# Patient Record
Sex: FEMALE | Race: WHITE | Employment: FULL TIME | URBAN - METROPOLITAN AREA
[De-identification: names, ages, dates, MRNs, and addresses within clinical notes are randomized per-mention and may not be internally consistent; named-entity substitution may affect disease eponyms.]

---

## 2017-08-01 ENCOUNTER — HOSPITAL ENCOUNTER (OUTPATIENT)
Dept: NON INVASIVE DIAGNOSTICS | Age: 42
Discharge: HOME | End: 2017-08-01
Attending: NURSE PRACTITIONER
Payer: COMMERCIAL

## 2017-08-01 DIAGNOSIS — R07.9 CHEST PAIN: ICD-10-CM

## 2017-08-01 DIAGNOSIS — R94.31 ABNORMAL EKG: ICD-10-CM

## 2017-08-01 DIAGNOSIS — R40.20 LOSS OF CONSCIOUSNESS (HCC): ICD-10-CM

## 2017-08-01 LAB
ATTENDING PHYSICIAN, CST07: NORMAL
DIAGNOSIS, 93000: NORMAL
DUKE TM SCORE RESULT, CST14: NORMAL
DUKE TREADMILL SCORE, CST13: NORMAL
ECG INTERP BEFORE EX, CST11: NORMAL
ECG INTERP DURING EX, CST12: NORMAL
FUNCTIONAL CAPACITY, CST17: NORMAL
KNOWN CARDIAC CONDITION, CST08: NORMAL
MAX. DIASTOLIC BP, CST04: 70 MMHG
MAX. HEART RATE, CST05: 173 BPM
MAX. SYSTOLIC BP, CST03: 150 MMHG
OVERALL BP RESPONSE TO EXERCISE, CST16: NORMAL
OVERALL HR RESPONSE TO EXERCISE, CST15: NORMAL
PEAK EX METS, CST10: 8.5 METS
PROTOCOL NAME, CST01: NORMAL
TEST INDICATION, CST09: NORMAL

## 2017-08-01 PROCEDURE — 93017 CV STRESS TEST TRACING ONLY: CPT

## 2018-11-02 ENCOUNTER — OFFICE VISIT (OUTPATIENT)
Dept: FAMILY MEDICINE CLINIC | Age: 43
End: 2018-11-02

## 2018-11-02 VITALS
DIASTOLIC BLOOD PRESSURE: 80 MMHG | WEIGHT: 127 LBS | RESPIRATION RATE: 16 BRPM | HEIGHT: 63 IN | OXYGEN SATURATION: 100 % | SYSTOLIC BLOOD PRESSURE: 118 MMHG | HEART RATE: 80 BPM | BODY MASS INDEX: 22.5 KG/M2 | TEMPERATURE: 97.7 F

## 2018-11-02 DIAGNOSIS — M79.671 RIGHT FOOT PAIN: Primary | ICD-10-CM

## 2018-11-02 RX ORDER — DULOXETIN HYDROCHLORIDE 60 MG/1
120 CAPSULE, DELAYED RELEASE ORAL DAILY
COMMUNITY

## 2018-11-02 RX ORDER — ACETAMINOPHEN 500 MG
TABLET ORAL 2 TIMES DAILY
COMMUNITY

## 2018-11-02 NOTE — PROGRESS NOTES
Chief Complaint   Patient presents with    Foot Pain     pt states right foot back for about a month and a half with piercing pain , not relived with ibuprofen     1. Have you been to the ER, urgent care clinic since your last visit? Hospitalized since your last visit? No    2. Have you seen or consulted any other health care providers outside of the [address] [place] [address] since your last visit? Include any pap smears or colon screening.  No

## 2018-11-08 NOTE — PROGRESS NOTES
Subjective: Dario Muñoz is an 37 y.o. female who presents with right foot pain. Pain over the 1st MTP joint. Worse with standing and walking. Usually wears athletic shoes. No injury or trauma. Past Medical History:   Diagnosis Date    Depression     ETOH abuse      Family History   Problem Relation Age of Onset    Cancer Father      Current Outpatient Medications   Medication Sig Dispense Refill    DULoxetine (CYMBALTA) 60 mg capsule Take 60 mg by mouth daily.  Cholecalciferol, Vitamin D3, (VITAMIN D3) 2,000 unit cap capsule Take  by mouth two (2) times a day.  multivitamin (ONE A DAY) tablet Take 1 Tab by mouth daily.  escitalopram (LEXAPRO) 20 mg tablet Take 20 mg by mouth daily. No Known Allergies  Social History     Socioeconomic History    Marital status: SINGLE     Spouse name: Not on file    Number of children: Not on file    Years of education: Not on file    Highest education level: Not on file   Social Needs    Financial resource strain: Not on file    Food insecurity - worry: Not on file    Food insecurity - inability: Not on file    Transportation needs - medical: Not on file   [employer] needs - non-medical: Not on file   Occupational History    Not on file   Tobacco Use    Smoking status: Former Smoker    Smokeless tobacco: Never Used   Substance and Sexual Activity    Alcohol use: No    Drug use: Not on file    Sexual activity: Not on file   Other Topics Concern    Not on file   Social History Narrative    Not on file       Review of Systems  Pertinent items are noted in HPI. Objective:     Visit Vitals  /80 (BP 1 Location: Left arm, BP Patient Position: Sitting)   Pulse 80   Temp 97.7 °F (36.5 °C) (Oral)   Resp 16   Ht 5' 3\" (1.6 m)   Wt 127 lb (57.6 kg)   LMP 10/16/2018 (Exact Date)   SpO2 100%   BMI 22.50 kg/m²       Gen: No apparent distress. Alert and oriented. Responds to all questions appropriately.    Ankle/Foot: right  Ankle Effusion: None  Great Toe MTP (normal/valgus/hallux rigidis/varus): bunion with mild hallux hallux valgus    ROM:  FROM of ankle and toes    Dynamic Test:  Gait: Normal    Palpation:  Achilles insertion tenderness: None   Achilles tendon tenderness: None   Great Toe IP joint tenderness: None  Great Toe MTP join tenderness: tender  Lisfranc Joint tenderness: None  Medial Malleolus tenderness: None  Lateral Malleolus tenderness: None  5th Metatarsal tenderness: None  Metatarsals tenderness: None  Navicular tenderness: None  Plantar fascia heel tenderness: None    Strength (0-5/5):   Plantarflexion: 5/5  Dorsiflexion: 5/5  Inversion: 5/5  Eversion: 5/5  FHL: 5/5  EHL: 5/5    Neuro/Vascular:  Pulses intact, no edema, and neurologically intact. Skin: No obvious rash      Assessment:       ICD-10-CM ICD-9-CM    1. Right foot pain M79.671 729.5      Sx likely due to bunion    Plan:       1. Discussed proper footwear. 2. Discussed corticosteroid injection if not improving. Medications:    1. Naproxin (Aleve): 220mg 1-2 tablets twice a day PRN. 2. Acetaminophen (Tylenol):  500mg 1-2 tablets every 6 hours as needed for pain.     RTC: PRN

## 2021-04-08 ENCOUNTER — APPOINTMENT (OUTPATIENT)
Dept: GENERAL RADIOLOGY | Age: 46
End: 2021-04-08
Attending: EMERGENCY MEDICINE
Payer: MEDICAID

## 2021-04-08 ENCOUNTER — APPOINTMENT (OUTPATIENT)
Dept: ULTRASOUND IMAGING | Age: 46
End: 2021-04-08
Attending: EMERGENCY MEDICINE
Payer: MEDICAID

## 2021-04-08 ENCOUNTER — HOSPITAL ENCOUNTER (EMERGENCY)
Age: 46
Discharge: HOME | End: 2021-04-08
Attending: EMERGENCY MEDICINE
Payer: MEDICAID

## 2021-04-08 VITALS
RESPIRATION RATE: 16 BRPM | HEART RATE: 94 BPM | SYSTOLIC BLOOD PRESSURE: 119 MMHG | WEIGHT: 140.65 LBS | BODY MASS INDEX: 24.92 KG/M2 | DIASTOLIC BLOOD PRESSURE: 85 MMHG | TEMPERATURE: 97.5 F | OXYGEN SATURATION: 97 %

## 2021-04-08 DIAGNOSIS — M79.604 ACUTE LEG PAIN, RIGHT: Primary | ICD-10-CM

## 2021-04-08 DIAGNOSIS — S32.401A CLOSED DISPLACED FRACTURE OF RIGHT ACETABULUM, UNSPECIFIED PORTION OF ACETABULUM, INITIAL ENCOUNTER (HCC): ICD-10-CM

## 2021-04-08 PROCEDURE — 74011000250 HC RX REV CODE- 250: Performed by: EMERGENCY MEDICINE

## 2021-04-08 PROCEDURE — 74011250637 HC RX REV CODE- 250/637: Performed by: EMERGENCY MEDICINE

## 2021-04-08 PROCEDURE — 73502 X-RAY EXAM HIP UNI 2-3 VIEWS: CPT

## 2021-04-08 PROCEDURE — 93971 EXTREMITY STUDY: CPT

## 2021-04-08 PROCEDURE — 99284 EMERGENCY DEPT VISIT MOD MDM: CPT

## 2021-04-08 RX ORDER — ACETAMINOPHEN 325 MG/1
650 TABLET ORAL
Status: DISCONTINUED | OUTPATIENT
Start: 2021-04-08 | End: 2021-04-08

## 2021-04-08 RX ORDER — CLONAZEPAM 0.5 MG/1
0.5 TABLET ORAL
COMMUNITY

## 2021-04-08 RX ORDER — METHOCARBAMOL 750 MG/1
750 TABLET, FILM COATED ORAL 4 TIMES DAILY
Qty: 4 TAB | Refills: 0 | Status: SHIPPED | OUTPATIENT
Start: 2021-04-08 | End: 2021-10-27

## 2021-04-08 RX ORDER — LIDOCAINE 4 G/100G
1 PATCH TOPICAL EVERY 24 HOURS
Status: DISCONTINUED | OUTPATIENT
Start: 2021-04-08 | End: 2021-04-08 | Stop reason: HOSPADM

## 2021-04-08 RX ORDER — IBUPROFEN 600 MG/1
600 TABLET ORAL
Status: COMPLETED | OUTPATIENT
Start: 2021-04-08 | End: 2021-04-08

## 2021-04-08 RX ADMIN — IBUPROFEN 600 MG: 600 TABLET, FILM COATED ORAL at 13:43

## 2021-04-08 NOTE — LETTER
[facility]  [address]  [address]  [phone]    Work/School Note    Date: 4/8/2021    To Whom It May concern:    Edgar Kong was seen and treated today in the emergency room by the following provider(s):  Attending Provider: Warden Ambrocio MD.      Edgar Kong is excused from work/school on 4/8/2021 through 4/11/2021. She is medically clear to return to work/school on 4/12/2021.         Sincerely,          Negrita Valenzuela RN

## 2021-04-08 NOTE — ED TRIAGE NOTES
Triage Note: Patient complains of right groin pain while standing for a long period of time today while training of work. Patient reports a \"pop\" when she walks. Denies injury.

## 2021-04-08 NOTE — ED PROVIDER NOTES
27-year-old female history of factor V Leiden, DVT, presenting to the ED for acute onset of right lower extremity pain. 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 The pain radiates up to her right hip. The pain is causing difficulty walking. She describes it as sharp in nature. No pain at rest.  She feels off balance when walking secondary to pain and difficulty bearing weight on that extremity. She has not yet taken any medication for the pain. She is not taking any blood thinning medication. She denies any chest pain, shortness of breath, abdominal pain, nausea, vomiting or recent illness.            Past Medical History:   Diagnosis Date    Depression     ETOH abuse     Factor [facility] Leiden mutation[facility]     Thromboembolus ([facility]        Past Surgical History:   Procedure Laterality Date    HX  SECTION      HX CHOLECYSTECTOMY           Family History:   Problem Relation Age of Onset    Cancer Father        Social History     Socioeconomic History    Marital status: SINGLE     Spouse name: Not on file    Number of children: Not on file    Years of education: Not on file    Highest education level: Not on file   Occupational History    Not on file   Social Needs    Financial resource strain: Not on file    Food insecurity     Worry: Not on file     Inability: Not on file    Transportation needs     Medical: Not on file     Non-medical: Not on file   Tobacco Use    Smoking status: Former Smoker    Smokeless tobacco: Never Used   Substance and Sexual Activity    Alcohol use: No    Drug use: Not Currently    Sexual activity: Not on file   Lifestyle    Physical activity     Days per week: Not on file     Minutes per session: Not on file    Stress: Not on file   Relationships    Social connections     Talks on phone: Not on file     Gets together: Not on file     Attends Sikhism service: Not on file     Active member of club or organization: Not on file     Attends meetings of clubs or organizations: Not on file     Relationship status: Not on file    Intimate partner violence     Fear of current or ex partner: Not on file     Emotionally abused: Not on file     Physically abused: Not on file     Forced sexual activity: Not on file   Other Topics Concern    Not on file   Social History Narrative    Not on file         ALLERGIES: Patient has no known allergies. Review of Systems   Constitutional: Negative for chills and fever. HENT: Negative for congestion. Eyes: Negative for visual disturbance. Respiratory: Negative for shortness of breath. Cardiovascular: Negative for chest pain. Gastrointestinal: Negative for abdominal pain, nausea and vomiting. Genitourinary: Negative for dysuria and hematuria. Musculoskeletal: Negative for back pain. Skin: Negative for rash. Allergic/Immunologic: Negative for immunocompromised state. Neurological: Negative for syncope, weakness and headaches. Psychiatric/Behavioral: Negative for confusion. Vitals:    04/08/21 1308   BP: (!) 136/97   Pulse: (!) 102   Resp: 16   Temp: 97.5 °F (36.4 °C)   SpO2: 98%   Weight: 63.8 kg (140 lb 10.5 oz)            Physical Exam  Vitals signs reviewed. Constitutional:       General: She is not in acute distress. Appearance: She is well-developed. HENT:      Head: Normocephalic and atraumatic. Eyes:      General: No scleral icterus. Neck:      Trachea: No tracheal deviation. Cardiovascular:      Rate and Rhythm: Normal rate. Pulmonary:      Effort: Pulmonary effort is normal. No respiratory distress. Abdominal:      General: There is no distension. Musculoskeletal:      Comments: No calf tenderness, no deformity, ecchymosis or swelling    Full range of motion at hip, knee and ankle on R   Skin:     General: Skin is warm and dry. Neurological:      Mental Status: She is alert and oriented to person, place, and time. MDM  Number of Diagnoses or Management Options  Acute leg pain, right  Closed displaced fracture of right acetabulum, unspecified portion of acetabulum, initial encounter [facility])  Diagnosis management comments: 27-year-old female history of factor V deficiency, DVT, presenting for acute onset of lower extremity discomfort. Will obtain duplex ultrasound to assess for possible DVT. No specific traumatic injury to warrant plain film imaging at this time. She likely is pulled a muscle. Will apply lidocaine patch, administer ibuprofen, will consider muscle relaxant if unrelieved by the above. Amount and/or Complexity of Data Reviewed  Tests in the radiology section of CPT®: ordered      ED Course as of Apr 10 1133   Thu Apr 08, 2021   1349 Duplex negative for DVT. [SL]   1917 Pt now complaining of hip pain with external rotation. Xray ordered. Added tylenol 650mg now dose. Pt is driving herself, so will avoid any sedating medications. [SL]   [phone] Patient told the nurse she did in fact take Tylenol just prior to arrival.  I have canceled the order.    [SL]   1425 Xray notable for RIGHT os     [SL]   1456 I spoke with Keegan Glasgow with orthopedics, the patient should follow-up with  at the [facility] office in roughly 1 week.     [SL]      ED Course User Index  [SL] Tana Bower MD       Procedures      Signed By: Ayde Bryan MD     April 10, 2021

## 2021-04-10 ENCOUNTER — IMMUNIZATION (OUTPATIENT)
Dept: INTERNAL MEDICINE CLINIC | Age: 46
End: 2021-04-10
Payer: MEDICAID

## 2021-04-10 DIAGNOSIS — Z23 ENCOUNTER FOR IMMUNIZATION: Primary | ICD-10-CM

## 2021-04-10 PROCEDURE — 91300 COVID-19, MRNA, LNP-S, PF, 30MCG/0.3ML DOSE(PFIZER): CPT | Performed by: FAMILY MEDICINE

## 2021-04-10 PROCEDURE — 0001A COVID-19, MRNA, LNP-S, PF, 30MCG/0.3ML DOSE(PFIZER): CPT | Performed by: FAMILY MEDICINE

## 2021-05-01 ENCOUNTER — IMMUNIZATION (OUTPATIENT)
Dept: INTERNAL MEDICINE CLINIC | Age: 46
End: 2021-05-01
Payer: MEDICAID

## 2021-05-01 DIAGNOSIS — Z23 ENCOUNTER FOR IMMUNIZATION: Primary | ICD-10-CM

## 2021-05-01 PROCEDURE — 91300 COVID-19, MRNA, LNP-S, PF, 30MCG/0.3ML DOSE(PFIZER): CPT | Performed by: FAMILY MEDICINE

## 2021-05-01 PROCEDURE — 0002A COVID-19, MRNA, LNP-S, PF, 30MCG/0.3ML DOSE(PFIZER): CPT | Performed by: FAMILY MEDICINE

## 2021-10-27 ENCOUNTER — VIRTUAL VISIT (OUTPATIENT)
Dept: FAMILY MEDICINE CLINIC | Age: 46
End: 2021-10-27
Payer: MEDICAID

## 2021-10-27 DIAGNOSIS — F41.8 DEPRESSION WITH ANXIETY: ICD-10-CM

## 2021-10-27 DIAGNOSIS — Z12.11 SCREEN FOR COLON CANCER: ICD-10-CM

## 2021-10-27 DIAGNOSIS — Z79.899 CHRONIC PRESCRIPTION BENZODIAZEPINE USE: ICD-10-CM

## 2021-10-27 DIAGNOSIS — F42.9 OBSESSIVE-COMPULSIVE DISORDER, UNSPECIFIED TYPE: ICD-10-CM

## 2021-10-27 DIAGNOSIS — Z76.89 ESTABLISHING CARE WITH NEW DOCTOR, ENCOUNTER FOR: ICD-10-CM

## 2021-10-27 DIAGNOSIS — E55.9 VITAMIN D DEFICIENCY: ICD-10-CM

## 2021-10-27 DIAGNOSIS — R53.83 FATIGUE, UNSPECIFIED TYPE: Primary | ICD-10-CM

## 2021-10-27 DIAGNOSIS — R00.0 TACHYCARDIA: ICD-10-CM

## 2021-10-27 DIAGNOSIS — Z13.220 SCREENING FOR LIPID DISORDERS: ICD-10-CM

## 2021-10-27 DIAGNOSIS — F10.21 ALCOHOLISM IN RECOVERY (HCC): ICD-10-CM

## 2021-10-27 DIAGNOSIS — R00.2 PALPITATIONS: ICD-10-CM

## 2021-10-27 DIAGNOSIS — F60.9 PERSONALITY DISORDER (HCC): ICD-10-CM

## 2021-10-27 DIAGNOSIS — Z11.59 ENCOUNTER FOR HEPATITIS C SCREENING TEST FOR LOW RISK PATIENT: ICD-10-CM

## 2021-10-27 PROCEDURE — 99204 OFFICE O/P NEW MOD 45 MIN: CPT | Performed by: FAMILY MEDICINE

## 2021-10-27 RX ORDER — BUSPIRONE HYDROCHLORIDE 30 MG/1
30 TABLET ORAL 2 TIMES DAILY
COMMUNITY
Start: 2021-10-16

## 2021-10-27 RX ORDER — FLUVOXAMINE MALEATE 100 MG/1
CAPSULE, EXTENDED RELEASE ORAL
COMMUNITY
Start: 2021-10-14 | End: 2022-09-30

## 2021-10-27 RX ORDER — NALTREXONE HYDROCHLORIDE 50 MG/1
TABLET, FILM COATED ORAL
COMMUNITY
Start: 2021-07-01

## 2021-10-27 NOTE — PROGRESS NOTES
Chief Complaint   Patient presents with   [facility] lab work and a physical.     1. Have you been to the ER, urgent care clinic since your last visit? Hospitalized since your last visit? Yes 09/29/2021 [facility], positive Covid-19 test.    2. Have you seen or consulted any other health care providers outside of the [address] [place] [address] since your last visit? Include any pap smears or colon screening.  No

## 2021-10-27 NOTE — PROGRESS NOTES
Jamie Amaya is a 55 y.o. female who was seen by synchronous (real-time) audio-video technology on 10/27/2021. Consent: Jamie Amaya, who was seen by synchronous (real-time) audio-video technology, and/or her healthcare decision maker, is aware that this patient-initiated, Telehealth encounter on 10/27/2021 is a billable service, with coverage as determined by her insurance carrier. She is aware that she may receive a bill and has provided verbal consent to proceed: Yes. Assessment & Plan:   1. Fatigue, unspecified type  May be multifactorial  Recommend labs  C/w follow up with psych and recommend in office exam  - CBC W/O DIFF; Future  - METABOLIC PANEL, COMPREHENSIVE; Future  - THYROID CASCADE PROFILE; Future    2. Palpitations  As above  - CBC W/O DIFF; Future  - METABOLIC PANEL, COMPREHENSIVE; Future  - THYROID CASCADE PROFILE; Future    3. Tachycardia  As above  - CBC W/O DIFF; Future  - METABOLIC PANEL, COMPREHENSIVE; Future  - THYROID CASCADE PROFILE; Future    4. Personality disorder [facility])  Per psych, changing medications presently    5. Depression with anxiety  As above    6. Obsessive-compulsive disorder, unspecified type  As above, adding luvox    7. Chronic prescription benzodiazepine use  On klonapin per psych    8. Vitamin D deficiency  Recheck labs  - VITAMIN D, 25 HYDROXY; Future    9. Alcoholism in recovery ([facility]  On naltrexone, participating aa, encouraged on sobriety    10. Screen for colon cancer  Recommend cscope  - REFERRAL FOR COLONOSCOPY    11. Screening for lipid disorders  Recommend flp  - METABOLIC PANEL, COMPREHENSIVE; Future  - LIPID PANEL; Future    12. Encounter for hepatitis C screening test for low risk patient  Recommend screening  - HEPATITIS C AB; Future    13. Establishing care with new doctor, encounter for  Recommend in office exam  Review of history  Will req pap          Pt was counseled on risks, benefits and alternatives of treatment options.  All questions were asked and answered and the patient was agreeable with the treatment plan as outlined. Total time on the date of encounter exceeded 45 minutes and included patient care, coordination of care, charting and preparation for visit. Subjective:    Ginna Sierra is a 55 y.o. 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  Last PCP: 3 years ago was seeing [facility] (saw an NP who left practice)  Dentist: goes to the dentist, last cleaning was in the spring time of last year  Eye Doc; last check a few years ago--has an appt in a few weeks, Dr Cindy Dominguez, [facility]--has readers, thinks she needs glasses    Exercise: started walking for fitness a few months ago, has fallen off a little from that  DIet: trying to eat healthy, mindful, drinking water, eating salad, appetite since she had covid has been \"weird\"--she did have anosmnia and loss of taste  Weight: was at 140, had a goal of losing weight, after covid she was having protein breakfast shakes because of her appetite, lost 8 lbs, is now 132    Tob: smoking about 1 ppd, goal of quitting, has been smoking for 17 years  Etoh: NO etoh abuse in recovery--was sober for years--when covid happened and she took a new job that didn't work out she relapsed and drank beer again, reports it got out of control, got sober again in summer 2020 and she is in aa, she is active in aa, she goes to 3 meetings a week  On naltrexone  Illicit: no    OBGYN: Dr Gladys Wiseman on Pap smears, just had it (NILM, HPV negative)--going for first mammogram in a few weeks at [facility]  PMS and back pain getting worse as she is getting older  Has had IUDs before, thinking about getting an IUD and planning to get with the coordinator    SA; not presently    When laying down to go to sleep, chest feeling tight, worried about cigarette smoking + covid    Psychiatry--Dr Anabel Suh at [facility]  Depression and Anxiety  Changing medications around now--getting weaned off cymbalta, moving to [place], has some increasing OCD tendencies as she is having higher stress  Was on wellbutrin as well but had stopped it because of the anxiety /ocd--holding this right now  Anxiety not controlled-not having panic attacks  Klonopin 3-4  0.5mg / day    Medications, allergies, PMH, PSH, SOCH, [address] reviewed and updated per routine protocol, see chart for review and changes if not noted here. ROS  A 12 point review of systems was negative except as noted here or in the HPI.     Objective:   Vital Signs: (As obtained by patient/caregiver at home)  Patient-Reported Vitals 10/27/2021   Patient-Reported Weight 132lb   Patient-Reported LMP 10/01/2021        [INSTRUCTIONS:  \"[x]\" Indicates a positive item  \"[]\" Indicates a negative item  -- DELETE ALL ITEMS NOT EXAMINED]    Constitutional: [x] Appears well-developed and well-nourished [x] No apparent distress      [] Abnormal -     Mental status: [x] Alert and awake  [x] Oriented to person/place/time [x] Able to follow commands    [] Abnormal -     Eyes:   EOM    [x]  Normal    [] Abnormal -   Sclera  [x]  Normal    [] Abnormal -          Discharge [x]  None visible   [] Abnormal -     HENT: [x] Normocephalic, atraumatic  [] Abnormal -   [x] Mouth/Throat: Mucous membranes are moist    External Ears [x] Normal  [] Abnormal -    Neck: [x] No visualized mass [] Abnormal -     Pulmonary/Chest: [x] Respiratory effort normal   [x] No visualized signs of difficulty breathing or respiratory distress        [] Abnormal -      Musculoskeletal:   [] Normal gait with no signs of ataxia         [x] Normal range of motion of neck        [] Abnormal -     Neurological:        [x] No Facial Asymmetry (Cranial nerve 7 motor function) (limited exam due to video visit)          [x] No gaze palsy        [] Abnormal -          Skin:        [x] No significant exanthematous lesions or discoloration noted on facial skin         [] Abnormal -            Psychiatric:       [x] Normal Affect [] Abnormal -        [x] No Hallucinations    Other pertinent observable physical exam findings:seated no distress, non toxic    We discussed the expected course, resolution and complications of the diagnosis(es) in detail. Medication risks, benefits, costs, interactions, and alternatives were discussed as indicated. I advised her to contact the office if her condition worsens, changes or fails to improve as anticipated. She expressed understanding with the diagnosis(es) and plan. Jamie Amaya is a 55 y.o. female who was evaluated by a video visit encounter for concerns as above. Patient identification was verified prior to start of the visit. A caregiver was present when appropriate. Due to this being a TeleHealth encounter (During IYMNP-08 public health emergency), evaluation of the following organ systems was limited: Vitals/Constitutional/EENT/Resp/CV/GI//MS/Neuro/Skin/Heme-Lymph-Imm. Pursuant to the emergency declaration under the [facility] waiver authority and the [employer] and [employer] Act, this Virtual  Visit was conducted, with patient's (and/or legal guardian's) consent, to reduce the patient's risk of exposure to COVID-19 and provide necessary medical care. Services were provided through a video synchronous discussion virtually to substitute for in-person clinic visit. Patient and provider were located at their individual homes. Chari Johnson MD  [facility]  10/27/21 11:05 AM     Portions of this note may have been populated using smart dictation software and may have \"sounds-like\" errors present.

## 2021-10-30 LAB
25(OH)D3+25(OH)D2 SERPL-MCNC: 47.1 NG/ML (ref 30–100)
ALBUMIN SERPL-MCNC: 4.7 G/DL (ref 3.8–4.8)
ALBUMIN/GLOB SERPL: 2.4 {RATIO} (ref 1.2–2.2)
ALP SERPL-CCNC: 88 IU/L (ref 44–121)
ALT SERPL-CCNC: 20 IU/L (ref 0–32)
AST SERPL-CCNC: 17 IU/L (ref 0–40)
BILIRUB SERPL-MCNC: 0.3 MG/DL (ref 0–1.2)
BUN SERPL-MCNC: 6 MG/DL (ref 6–24)
BUN/CREAT SERPL: 7 (ref 9–23)
CALCIUM SERPL-MCNC: 9.2 MG/DL (ref 8.7–10.2)
CHLORIDE SERPL-SCNC: 101 MMOL/L (ref 96–106)
CHOLEST SERPL-MCNC: 182 MG/DL (ref 100–199)
CO2 SERPL-SCNC: 24 MMOL/L (ref 20–29)
CREAT SERPL-MCNC: 0.84 MG/DL (ref 0.57–1)
ERYTHROCYTE [DISTWIDTH] IN BLOOD BY AUTOMATED COUNT: 12.6 % (ref 11.7–15.4)
GLOBULIN SER CALC-MCNC: 2 G/DL (ref 1.5–4.5)
GLUCOSE SERPL-MCNC: 123 MG/DL (ref 65–99)
HCT VFR BLD AUTO: 43.8 % (ref 34–46.6)
HCV AB S/CO SERPL IA: 0.1 S/CO RATIO (ref 0–0.9)
HDLC SERPL-MCNC: 65 MG/DL
HGB BLD-MCNC: 14.5 G/DL (ref 11.1–15.9)
IMP & REVIEW OF LAB RESULTS: NORMAL
INTERPRETIVE COMMENT, 010391: NORMAL
LDLC SERPL CALC-MCNC: 102 MG/DL (ref 0–99)
MCH RBC QN AUTO: 31.5 PG (ref 26.6–33)
MCHC RBC AUTO-ENTMCNC: 33.1 G/DL (ref 31.5–35.7)
MCV RBC AUTO: 95 FL (ref 79–97)
PLATELET # BLD AUTO: 368 X10E3/UL (ref 150–450)
POTASSIUM SERPL-SCNC: 3.9 MMOL/L (ref 3.5–5.2)
PROT SERPL-MCNC: 6.7 G/DL (ref 6–8.5)
RBC # BLD AUTO: 4.6 X10E6/UL (ref 3.77–5.28)
SODIUM SERPL-SCNC: 139 MMOL/L (ref 134–144)
T3FREE SERPL-MCNC: 3 PG/ML (ref 2–4.4)
T4 FREE SERPL-MCNC: 1.24 NG/DL (ref 0.82–1.77)
TRIGL SERPL-MCNC: 84 MG/DL (ref 0–149)
TSH SERPL DL<=0.005 MIU/L-ACNC: 0.38 UIU/ML (ref 0.45–4.5)
VLDLC SERPL CALC-MCNC: 15 MG/DL (ref 5–40)
WBC # BLD AUTO: 11.8 X10E3/UL (ref 3.4–10.8)

## 2022-03-09 ENCOUNTER — VIRTUAL VISIT (OUTPATIENT)
Dept: FAMILY MEDICINE CLINIC | Age: 47
End: 2022-03-09
Payer: MEDICAID

## 2022-03-09 DIAGNOSIS — B96.89 ACUTE BACTERIAL SINUSITIS: Primary | ICD-10-CM

## 2022-03-09 DIAGNOSIS — J01.90 ACUTE BACTERIAL SINUSITIS: Primary | ICD-10-CM

## 2022-03-09 PROCEDURE — 99213 OFFICE O/P EST LOW 20 MIN: CPT | Performed by: FAMILY MEDICINE

## 2022-03-09 RX ORDER — QUETIAPINE FUMARATE 50 MG/1
TABLET, EXTENDED RELEASE ORAL
COMMUNITY
Start: 2022-03-02 | End: 2022-10-28 | Stop reason: SDUPTHER

## 2022-03-09 RX ORDER — DOXYCYCLINE 100 MG/1
100 TABLET ORAL 2 TIMES DAILY
Qty: 20 TABLET | Refills: 0 | Status: SHIPPED | OUTPATIENT
Start: 2022-03-09 | End: 2022-03-19

## 2022-03-09 RX ORDER — DISULFIRAM 250 MG/1
250 TABLET ORAL DAILY
COMMUNITY
Start: 2022-01-12

## 2022-03-09 RX ORDER — BUPROPION HYDROCHLORIDE 300 MG/1
TABLET ORAL
COMMUNITY
Start: 2022-01-26

## 2022-03-09 NOTE — PROGRESS NOTES
Javier Lopes is a 55 y.o. female who was seen by synchronous (real-time) audio-video technology on 3/9/2022. Consent: Javier Lopes, who was seen by synchronous (real-time) audio-video technology, and/or her healthcare decision maker, is aware that this patient-initiated, Telehealth encounter on 3/9/2022 is a billable service, with coverage as determined by her insurance carrier. She is aware that she may receive a bill and has provided verbal consent to proceed: Yes. Assessment & Plan:   1. Acute bacterial sinusitis  At threshold for treatment  Pt is given anticipatory guidance  Supportive care  Doxy bid x10d  Recheck if not improved  - doxycycline (ADOXA) 100 mg tablet; Take 1 Tablet by mouth two (2) times a day for 10 days. Dispense: 20 Tablet; Refill: 0          Pt was counseled on risks, benefits and alternatives of treatment options. All questions were asked and answered and the patient was agreeable with the treatment plan as outlined. Subjective: Javier Lopes is a 55 y.o. female who was seen for Sinus Pain (x 5days.) and Headache      Pt tells me that for about 5-7 days shes started with upper teeth pain, now facial pressure bilatearlly  She went to the dentist at first  She is having daily headaches  Last night she tells me things acutely worsened and her face is feeling puffy    It is positional and if she bends over it moderately more uncomfortable    This feels like other sinus infections she's had before  No ear pain clicking and popping    Medications, allergies, PMH, PSH, SOCH, [facility] reviewed and updated per routine protocol, see chart for review and changes if not noted here. ROS  A 12 point review of systems was negative except as noted here or in the HPI.     Objective:   Vital Signs: (As obtained by patient/caregiver at home)  Patient-Reported Vitals 3/9/2022   Patient-Reported Weight -   Patient-Reported Systolic  613   Patient-Reported Diastolic 73   Patient-Reported LMP - [INSTRUCTIONS:  \"[x]\" Indicates a positive item  \"[]\" Indicates a negative item  -- DELETE ALL ITEMS NOT EXAMINED]    Constitutional: [x] Appears well-developed and well-nourished [x] No apparent distress      [] Abnormal -     Mental status: [x] Alert and awake  [x] Oriented to person/place/time [x] Able to follow commands    [] Abnormal -     Eyes:   EOM    [x]  Normal    [] Abnormal -   Sclera  [x]  Normal    [] Abnormal -          Discharge [x]  None visible   [] Abnormal -     HENT: [x] Normocephalic, atraumatic  [] Abnormal -   [x] Mouth/Throat: Mucous membranes are moist    External Ears [x] Normal  [] Abnormal -    Neck: [x] No visualized mass [] Abnormal -     Pulmonary/Chest: [x] Respiratory effort normal   [x] No visualized signs of difficulty breathing or respiratory distress        [] Abnormal -      Musculoskeletal:   [] Normal gait with no signs of ataxia         [x] Normal range of motion of neck        [] Abnormal -     Neurological:        [x] No Facial Asymmetry (Cranial nerve 7 motor function) (limited exam due to video visit)          [x] No gaze palsy        [] Abnormal -          Skin:        [x] No significant exanthematous lesions or discoloration noted on facial skin         [] Abnormal -            Psychiatric:       [x] Normal Affect [] Abnormal -        [x] No Hallucinations    Other pertinent observable physical exam findings:ambulatory through home    We discussed the expected course, resolution and complications of the diagnosis(es) in detail. Medication risks, benefits, costs, interactions, and alternatives were discussed as indicated. I advised her to contact the office if her condition worsens, changes or fails to improve as anticipated. She expressed understanding with the diagnosis(es) and plan. Merly Robins is a 55 y.o. female who was evaluated by a video visit encounter for concerns as above. Patient identification was verified prior to start of the visit.  A caregiver was present when appropriate. Due to this being a TeleHealth encounter (During LZWQI-52 public health emergency), evaluation of the following organ systems was limited: Vitals/Constitutional/EENT/Resp/CV/GI//MS/Neuro/Skin/Heme-Lymph-Imm. Pursuant to the emergency declaration under the [address] [place] [address] waiver authority and the [employer] and [employer] Act, this Virtual  Visit was conducted, with patient's (and/or legal guardian's) consent, to reduce the patient's risk of exposure to COVID-19 and provide necessary medical care. Services were provided through a video synchronous discussion virtually to substitute for in-person clinic visit. Patient and provider were located at their individual homes. Kelly Marie MD  [facility]  03/09/22 4:21 PM     Portions of this note may have been populated using smart dictation software and may have \"sounds-like\" errors present.

## 2022-03-09 NOTE — PROGRESS NOTES
Chief Complaint   Patient presents with    Sinus Pain     x 5days.  Headache     1. Have you been to the ER, urgent care clinic since your last visit? Hospitalized since your last visit? No    2. Have you seen or consulted any other health care providers outside of the [address] [place] [address] since your last visit? Include any pap smears or colon screening.  No

## 2022-09-16 ENCOUNTER — TELEPHONE (OUTPATIENT)
Dept: FAMILY MEDICINE CLINIC | Age: 47
End: 2022-09-16

## 2022-09-16 DIAGNOSIS — U07.1 COVID-19: Primary | ICD-10-CM

## 2022-09-16 NOTE — LETTER
NOTIFICATION RETURN TO WORK / SCHOOL    9/16/2022 6:13 PM    Ms. Will Alfaro  [phone]      To Whom It May Concern:    Will Alfaro is currently under the care of [address] [place] [address]. She will return to work/school on: 9/21/22, she tested positive for COVID and is currently on paxlovid and symptomatic. If there are questions or concerns please have the patient contact our office.         Sincerely,      Moraima Arreaga MD

## 2022-09-16 NOTE — TELEPHONE ENCOUNTER
9/16/2022  6:10 PM    Patient called with COVID symptoms and two positive COVID tests, states that she has chills, fever, decreased appetite, and cough. Currently on antidepressants: we discussed the use of seroquel and paxlovid concurrently and how it increases the seroquel levels dangerously. Patient stated that she only uses seroquel on an as needed basis. Will not take seroquel tonight, and is starting paxlovid tomorrow. Dileep Bustillos MD       1. COVID-19  Plan:  - nirmatrelvir-ritonavir (PAXLOVID) 300 mg (150 mg x 2)-100 mg; Use as directed  Indications: COVID-19 (emergency use authorization)  Dispense: 1 Box;  Refill: 0

## 2022-09-30 ENCOUNTER — VIRTUAL VISIT (OUTPATIENT)
Dept: FAMILY MEDICINE CLINIC | Age: 47
End: 2022-09-30
Payer: MEDICAID

## 2022-09-30 DIAGNOSIS — J02.9 PHARYNGITIS, UNSPECIFIED ETIOLOGY: Primary | ICD-10-CM

## 2022-09-30 PROCEDURE — 99442 PR PHYS/QHP TELEPHONE EVALUATION 11-20 MIN: CPT | Performed by: FAMILY MEDICINE

## 2022-09-30 RX ORDER — CEFUROXIME AXETIL 500 MG/1
500 TABLET ORAL 2 TIMES DAILY
Qty: 20 TABLET | Refills: 0 | Status: SHIPPED | OUTPATIENT
Start: 2022-09-30 | End: 2022-10-10

## 2022-09-30 NOTE — PROGRESS NOTES
Terri Clancy is a 52 y.o. female evaluated via telephone on 9/30/2022. Consent:  She and/or health care decision maker is aware that she may receive a bill for this telephone service, depending on her insurance coverage, and has provided verbal consent to proceed: Yes      Documentation:  I communicated with the patient and/or health care decision maker about her sore throat. Details of this discussion including any medical advice provided:       Subjective: Terri Clancy was seen for Post-COVID Symptoms (Lethargy since having Covid 2 weeks ago.) and Sore Throat      Patient presents with:  Post-COVID Symptoms: Lethargy since having Covid 2 weeks ago. Sore Throat    She felt like she was getting better, only to get worse 4-5 days ago. The sore throat started 5 days ago. She also has swollen glands in her neck. Her throat is red with possibly one white patch. Review of Systems   Constitutional:  Positive for malaise/fatigue. Negative for chills and fever. Sweating   HENT:  Positive for congestion and sore throat. Negative for ear pain. Respiratory:  Positive for cough and sputum production. Negative for hemoptysis, shortness of breath and wheezing. He sputum is green   Genitourinary:  Negative for dysuria. Neurological:  Negative for headaches. Objective:         Assessment & Plan:   Diagnoses and all orders for this visit:    1. Pharyngitis, unspecified etiology  -     cefUROXime (CEFTIN) 500 mg tablet; Take 1 Tablet by mouth two (2) times a day for 10 days. Possible strep  Ceftin  Rest, push fluids, salt water gargles prn  Advil prn    Follow-up and Dispositions    Return if not better in 3 days. Reviewed plan of care. Patient has provided input and agrees with goals.       I affirm this is a Patient Initiated Episode with an Established Patient who has not had a related appointment within my department in the past 7 days or scheduled within the next 24 hours.     Total Time: minutes: 11-20 minutes    Note: not billable if this call serves to triage the patient into an appointment for the relevant concern      Rajat Hull MD

## 2022-09-30 NOTE — PROGRESS NOTES
Chief Complaint   Patient presents with    Post-COVID Symptoms     Lethargy since having Covid 2 weeks ago. Sore Throat     1. Have you been to the ER, urgent care clinic since your last visit? Hospitalized since your last visit? No    2. Have you seen or consulted any other health care providers outside of the [address] [place] [address] since your last visit? Include any pap smears or colon screening.  No

## 2022-10-28 ENCOUNTER — OFFICE VISIT (OUTPATIENT)
Dept: FAMILY MEDICINE CLINIC | Age: 47
End: 2022-10-28
Payer: MEDICAID

## 2022-10-28 VITALS
DIASTOLIC BLOOD PRESSURE: 82 MMHG | OXYGEN SATURATION: 99 % | HEIGHT: 63 IN | RESPIRATION RATE: 20 BRPM | BODY MASS INDEX: 23.21 KG/M2 | SYSTOLIC BLOOD PRESSURE: 126 MMHG | HEART RATE: 101 BPM | TEMPERATURE: 97.5 F | WEIGHT: 131 LBS

## 2022-10-28 DIAGNOSIS — F41.0 PANIC DISORDER WITHOUT AGORAPHOBIA: ICD-10-CM

## 2022-10-28 DIAGNOSIS — F10.21 ALCOHOLISM IN RECOVERY (HCC): ICD-10-CM

## 2022-10-28 DIAGNOSIS — F51.04 PSYCHOPHYSIOLOGIC INSOMNIA: ICD-10-CM

## 2022-10-28 DIAGNOSIS — F33.2 MDD (MAJOR DEPRESSIVE DISORDER), RECURRENT SEVERE, WITHOUT PSYCHOSIS (HCC): ICD-10-CM

## 2022-10-28 DIAGNOSIS — Z00.00 WELL WOMAN EXAM WITHOUT GYNECOLOGICAL EXAM: Primary | ICD-10-CM

## 2022-10-28 DIAGNOSIS — R73.01 IFG (IMPAIRED FASTING GLUCOSE): ICD-10-CM

## 2022-10-28 DIAGNOSIS — Z23 NEEDS FLU SHOT: ICD-10-CM

## 2022-10-28 DIAGNOSIS — Z12.11 SCREENING FOR COLON CANCER: ICD-10-CM

## 2022-10-28 DIAGNOSIS — Z79.899 CHRONIC PRESCRIPTION BENZODIAZEPINE USE: ICD-10-CM

## 2022-10-28 DIAGNOSIS — Z00.00 LABORATORY EXAMINATION ORDERED AS PART OF A COMPLETE PHYSICAL EXAMINATION: ICD-10-CM

## 2022-10-28 PROCEDURE — 90686 IIV4 VACC NO PRSV 0.5 ML IM: CPT | Performed by: FAMILY MEDICINE

## 2022-10-28 PROCEDURE — 99214 OFFICE O/P EST MOD 30 MIN: CPT | Performed by: FAMILY MEDICINE

## 2022-10-28 PROCEDURE — 99396 PREV VISIT EST AGE 40-64: CPT | Performed by: FAMILY MEDICINE

## 2022-10-28 RX ORDER — QUETIAPINE FUMARATE 50 MG/1
TABLET, EXTENDED RELEASE ORAL
Qty: 60 TABLET | Refills: 3 | Status: SHIPPED | OUTPATIENT
Start: 2022-10-28 | End: 2022-11-21 | Stop reason: SDUPTHER

## 2022-10-28 NOTE — PROGRESS NOTES
Chief Complaint   Patient presents with    Physical     No PAP- has GYN, sees Dr. Ana Salgado next week

## 2022-10-28 NOTE — PROGRESS NOTES
Subjective:   52 y.o. female for Well Woman Check. Her gyne and breast care is done elsewhere by her Ob-Gyne physician. Patient Active Problem List   Diagnosis Code    Alcoholism in recovery [facility]) F10.21    Chronic prescription benzodiazepine use Z79.899    Personality disorder ([facility] [address] F60.9    Anxiety F41.9    Depression F32. A    Panic attack due to post traumatic stress disorder (PTSD) F41.0, F43.10    OCD (obsessive compulsive disorder) F42.9    Seasonal affective disorder (HCC) F33.8     Past Medical History:   Diagnosis Date    Depression     ETOH abuse     Factor 5 Leiden mutation, heterozygous ([facility]     Factor 5 Leiden mutation, heterozygous ([facility] [address]      Past Surgical History:   Procedure Laterality Date    HX  SECTION      HX CHOLECYSTECTOMY       Family History   Problem Relation Age of Onset    Cancer Father      Social History     Tobacco Use    Smoking status: Every Day     Packs/day: 1.00     Years: 18.00     Pack years: 18.00     Types: Cigarettes     Start date:     Smokeless tobacco: Never   Substance Use Topics    Alcohol use: Not Currently     Comment: recovering alcoholic    Tob :trying to quit smoking, worried, doesn't want to get lung cancer  Etoh: in recovery: last drink in  going to [employer] meetings, sometimes can't go to as many as she wants to but she is working hard at attending  *has a sponsor*  Illicit: no  MJ: no    Not currently sa  Declines std testing    Scheduled for pap and mammo next month with her obgyn  Flu shot today    Dentist: goes regularly  Eye Doctor: goes regularly--vision is change, [facility]    Exercise: 3x a week    ROS: Feeling generally well. No TIA's or unusual headaches, no dysphagia. No prolonged cough. No dyspnea or chest pain on exertion. No abdominal pain, change in bowel habits, black or bloody stools. No urinary tract symptoms. No new or unusual musculoskeletal symptoms.     Specific concerns today: she had prev contacted me, she's been seeing dr Kenisha Maldonado at [facility] for psych, she is going to need to transfer care as he is retiring. Objective: The patient appears well, alert, oriented x 3, in no distress. Visit Vitals  /82   Pulse (!) 101   Temp 97.5 °F (36.4 °C) (Temporal)   Resp 20   Ht 5' 3\" (1.6 m)   Wt 131 lb (59.4 kg)   SpO2 99%   BMI 23.21 kg/m²     ENT normal.  Neck supple. No adenopathy or thyromegaly. DELORES. Lungs are clear, good air entry, no wheezes, rhonchi or rales. S1 and S2 normal, no murmurs, regular rate and rhythm. Abdomen soft without tenderness, guarding, mass or organomegaly. Extremities show no edema, normal peripheral pulses. Neurological is normal, no focal findings. Breast and Pelvic exams are deferred. Assessment/Plan:   Well Woman  stop smoking (advice and handout given), routine labs ordered, have labs drawn prior to ROV, call if any problems    ICD-10-CM ICD-9-CM    1. Well woman exam without gynecological exam  Z00.00 V70.0       2. Alcoholism in recovery ([facility] [address]  F10.21 303.93 REFERRAL TO GASTROENTEROLOGY      COMPLIANCE DRUG SCREEN/PRESCRIPTION MONITORING      COMPLIANCE DRUG SCREEN/PRESCRIPTION MONITORING      3. Chronic prescription benzodiazepine use  Z79.899 V58.69 COMPLIANCE DRUG SCREEN/PRESCRIPTION MONITORING      COMPLIANCE DRUG SCREEN/PRESCRIPTION MONITORING      4. Screening for colon cancer  Z12.11 V76.51 REFERRAL TO GASTROENTEROLOGY      5. IFG (impaired fasting glucose)  R73.01 790.21 HEMOGLOBIN A1C WITH EAG      CBC W/O DIFF      HEMOGLOBIN A1C WITH EAG      CBC W/O DIFF      6. Laboratory examination ordered as part of a complete physical examination  Z00.00 V72.62 HEMOGLOBIN A1C WITH EAG      CBC W/O DIFF      LIPID PANEL      METABOLIC PANEL, COMPREHENSIVE      THYROID CASCADE PROFILE      HEMOGLOBIN A1C WITH EAG      CBC W/O DIFF      LIPID PANEL      METABOLIC PANEL, COMPREHENSIVE      THYROID CASCADE PROFILE      7.  MDD (major depressive disorder), recurrent severe, without psychosis ([facility] [address]  F33.2 296.33 COMPLIANCE DRUG SCREEN/PRESCRIPTION MONITORING      COMPLIANCE DRUG SCREEN/PRESCRIPTION MONITORING      8. Panic disorder without agoraphobia  F41.0 300.01 COMPLIANCE DRUG SCREEN/PRESCRIPTION MONITORING      COMPLIANCE DRUG SCREEN/PRESCRIPTION MONITORING      9. Psychophysiologic insomnia  F51.04 307.42 COMPLIANCE DRUG SCREEN/PRESCRIPTION MONITORING      COMPLIANCE DRUG SCREEN/PRESCRIPTION MONITORING      10. Needs flu shot  Z23 V04.81 INFLUENZA, FLUARIX, FLULAVAL, FLUZONE (AGE 6 MO+), AFLURIA(AGE 3Y+) IM, PF, 0.5 ML      Flu shot today  Records are reviewed from her prior psychiatrist Dr. Oralia Harada. It seems the patient is unable to find other physicians locally who are willing to continue prescribing her medications as she has been on the same dose of benzodiazepine now for greater than 20 years. She is stable on this medication and 2 years into sobriety from alcohol, it is important that we not change medications and put her at more risk for loss of this sobriety  I will continue her medications  Urine drug screen  Every 3 months we will see each other for a visit  Drug screens per office protocol and prescribing only at visits no refills between and no early refills of controlled substances  The patient is understanding of these standard rules that are applicable to all of our patients.   We will continue her medications for now at current doses, we may need to address in the future anxiety and smoking cessation  Otherwise she is doing well I would encourage her to continue to exercise and follow a healthy diet and we will review her routine labs when they are returned

## 2022-11-02 ENCOUNTER — PATIENT MESSAGE (OUTPATIENT)
Dept: FAMILY MEDICINE CLINIC | Age: 47
End: 2022-11-02

## 2022-11-04 LAB
ALBUMIN SERPL-MCNC: 4.1 G/DL (ref 3.8–4.8)
ALBUMIN/GLOB SERPL: 1.9 {RATIO} (ref 1.2–2.2)
ALP SERPL-CCNC: 68 IU/L (ref 44–121)
ALT SERPL-CCNC: 34 IU/L (ref 0–32)
AST SERPL-CCNC: 28 IU/L (ref 0–40)
BILIRUB SERPL-MCNC: 0.4 MG/DL (ref 0–1.2)
BUN SERPL-MCNC: 6 MG/DL (ref 6–24)
BUN/CREAT SERPL: 8 (ref 9–23)
CALCIUM SERPL-MCNC: 8.9 MG/DL (ref 8.7–10.2)
CHLORIDE SERPL-SCNC: 100 MMOL/L (ref 96–106)
CHOLEST SERPL-MCNC: 196 MG/DL (ref 100–199)
CO2 SERPL-SCNC: 25 MMOL/L (ref 20–29)
CREAT SERPL-MCNC: 0.74 MG/DL (ref 0.57–1)
EGFR: 100 ML/MIN/1.73
ERYTHROCYTE [DISTWIDTH] IN BLOOD BY AUTOMATED COUNT: 11.7 % (ref 11.7–15.4)
EST. AVERAGE GLUCOSE BLD GHB EST-MCNC: 100 MG/DL
GLOBULIN SER CALC-MCNC: 2.2 G/DL (ref 1.5–4.5)
GLUCOSE SERPL-MCNC: 73 MG/DL (ref 70–99)
HBA1C MFR BLD: 5.1 % (ref 4.8–5.6)
HCT VFR BLD AUTO: 42.4 % (ref 34–46.6)
HDLC SERPL-MCNC: 55 MG/DL
HGB BLD-MCNC: 14.4 G/DL (ref 11.1–15.9)
IMP & REVIEW OF LAB RESULTS: NORMAL
LDLC SERPL CALC-MCNC: 119 MG/DL (ref 0–99)
MCH RBC QN AUTO: 31.9 PG (ref 26.6–33)
MCHC RBC AUTO-ENTMCNC: 34 G/DL (ref 31.5–35.7)
MCV RBC AUTO: 94 FL (ref 79–97)
PLATELET # BLD AUTO: 330 X10E3/UL (ref 150–450)
POTASSIUM SERPL-SCNC: 4.1 MMOL/L (ref 3.5–5.2)
PROT SERPL-MCNC: 6.3 G/DL (ref 6–8.5)
RBC # BLD AUTO: 4.51 X10E6/UL (ref 3.77–5.28)
SODIUM SERPL-SCNC: 140 MMOL/L (ref 134–144)
TRIGL SERPL-MCNC: 123 MG/DL (ref 0–149)
TSH SERPL DL<=0.005 MIU/L-ACNC: 0.52 UIU/ML (ref 0.45–4.5)
VLDLC SERPL CALC-MCNC: 22 MG/DL (ref 5–40)
WBC # BLD AUTO: 9.8 X10E3/UL (ref 3.4–10.8)

## 2022-11-08 LAB — DRUGS UR: NORMAL

## 2022-11-16 LAB — PAP SMEAR, EXTERNAL: NORMAL

## 2022-11-21 RX ORDER — QUETIAPINE FUMARATE 50 MG/1
TABLET, EXTENDED RELEASE ORAL
Qty: 180 TABLET | Refills: 1 | Status: SHIPPED | OUTPATIENT
Start: 2022-11-21

## 2023-01-09 RX ORDER — DULOXETIN HYDROCHLORIDE 60 MG/1
120 CAPSULE, DELAYED RELEASE ORAL DAILY
Qty: 180 CAPSULE | Refills: 1 | Status: SHIPPED | OUTPATIENT
Start: 2023-01-09

## 2023-01-09 NOTE — TELEPHONE ENCOUNTER
Pt called stating she's been totally out of Duloxetine for 2 days, has follow up with Dr. Brennan Boyd on Wednesday, but is not doing well without her medication. Pt asking if Dr. Brennan Boyd can please send refill to pharmacy today.  Alvarom

## 2023-01-11 ENCOUNTER — VIRTUAL VISIT (OUTPATIENT)
Dept: FAMILY MEDICINE CLINIC | Age: 48
End: 2023-01-11
Payer: MEDICAID

## 2023-01-11 DIAGNOSIS — F10.21 ALCOHOLISM IN RECOVERY (HCC): ICD-10-CM

## 2023-01-11 DIAGNOSIS — Z79.899 CHRONIC PRESCRIPTION BENZODIAZEPINE USE: Primary | ICD-10-CM

## 2023-01-11 DIAGNOSIS — F33.1 MODERATE EPISODE OF RECURRENT MAJOR DEPRESSIVE DISORDER (HCC): ICD-10-CM

## 2023-01-11 DIAGNOSIS — F41.9 ANXIETY: ICD-10-CM

## 2023-01-11 PROCEDURE — 99214 OFFICE O/P EST MOD 30 MIN: CPT | Performed by: FAMILY MEDICINE

## 2023-01-11 RX ORDER — DISULFIRAM 250 MG/1
250 TABLET ORAL DAILY
Qty: 90 TABLET | Refills: 3 | Status: SHIPPED | OUTPATIENT
Start: 2023-01-11

## 2023-01-11 RX ORDER — BUSPIRONE HYDROCHLORIDE 30 MG/1
30 TABLET ORAL 2 TIMES DAILY
Qty: 180 TABLET | Refills: 3 | Status: SHIPPED | OUTPATIENT
Start: 2023-01-11

## 2023-01-11 RX ORDER — BUPROPION HYDROCHLORIDE 300 MG/1
TABLET ORAL
Qty: 90 TABLET | Refills: 3 | Status: SHIPPED | OUTPATIENT
Start: 2023-01-11

## 2023-01-11 RX ORDER — DULOXETIN HYDROCHLORIDE 60 MG/1
120 CAPSULE, DELAYED RELEASE ORAL DAILY
Qty: 180 CAPSULE | Refills: 1 | Status: CANCELLED | OUTPATIENT
Start: 2023-01-11

## 2023-01-11 RX ORDER — NALTREXONE HYDROCHLORIDE 50 MG/1
50 TABLET, FILM COATED ORAL DAILY
Qty: 90 TABLET | Refills: 3 | Status: SHIPPED | OUTPATIENT
Start: 2023-01-11

## 2023-01-11 RX ORDER — CLONAZEPAM 0.5 MG/1
0.5 TABLET ORAL 4 TIMES DAILY
Qty: 360 TABLET | Refills: 0 | Status: SHIPPED | OUTPATIENT
Start: 2023-01-11

## 2023-01-11 RX ORDER — QUETIAPINE FUMARATE 50 MG/1
TABLET, EXTENDED RELEASE ORAL
Qty: 180 TABLET | Refills: 3 | Status: SHIPPED | OUTPATIENT
Start: 2023-01-11

## 2023-01-11 NOTE — PROGRESS NOTES
Chief Complaint   Patient presents with    Medication Refill     Pt needs refills on depression meds- no concerns, meds working well.    Will like a 3 month supply if possible

## 2023-01-11 NOTE — PROGRESS NOTES
Jason Jose is a 52 y.o. female who was seen by synchronous (real-time) audio-video technology on 1/11/2023. Consent: Jason Jose, who was seen by synchronous (real-time) audio-video technology, and/or her healthcare decision maker, is aware that this patient-initiated, Telehealth encounter on 1/11/2023 is a billable service, with coverage as determined by her insurance carrier. She is aware that she may receive a bill and has provided verbal consent to proceed: Yes. Assessment & Plan:   1. Chronic prescription benzodiazepine use  Stable, compliant with medication use, will continue,  reviewed  - clonazePAM (KlonoPIN) 0.5 mg tablet; Take 1 Tablet by mouth four (4) times daily. Max Daily Amount: 2 mg. Dispense: 360 Tablet; Refill: 0    2. Anxiety  Not at goal  Has had a lost of moving pieces  Recheck in about 1 mo  - clonazePAM (KlonoPIN) 0.5 mg tablet; Take 1 Tablet by mouth four (4) times daily. Max Daily Amount: 2 mg. Dispense: 360 Tablet; Refill: 0  - QUEtiapine SR (SEROquel XR) 50 mg sr tablet; TAKE 2 TABLETS BY MOUTH AT BEDTIME  Dispense: 180 Tablet; Refill: 3  - busPIRone (BUSPAR) 30 mg tablet; Take 1 Tablet by mouth two (2) times a day. Dispense: 180 Tablet; Refill: 3    3. Moderate episode of recurrent major depressive disorder (HCC)  As above in 2  - buPROPion XL (WELLBUTRIN XL) 300 mg XL tablet; TAKE 1 TABLET BY MOUTH EVERY DAY  Dispense: 90 Tablet; Refill: 3  - QUEtiapine SR (SEROquel XR) 50 mg sr tablet; TAKE 2 TABLETS BY MOUTH AT BEDTIME  Dispense: 180 Tablet; Refill: 3    4. Alcoholism in recovery ([facility] [address]  Stable, continued abstaining   - naltrexone (DEPADE) 50 mg tablet; Take 1 Tablet by mouth daily. Dispense: 90 Tablet; Refill: 3  - disulfiram (ANTABUSE) 250 mg tablet; Take 1 Tablet by mouth daily. Dispense: 90 Tablet; Refill: 3          Pt was counseled on risks, benefits and alternatives of treatment options.  All questions were asked and answered and the patient was agreeable with the treatment plan as outlined. Subjective: Patric Richardson is a 52 y.o. female who was seen for Medication Refill (Pt needs refills on depression meds- no concerns, meds working well. Kiesha Arita like a 3 month supply if possible )      Needs med refills  Off of exercise routine  Has had stress with caring for her child    Some nights trouble sleeping  Some times mood feels less controlled  She wonders how much is organic and how much is related to her being under stress, off her schedule, coming back from break (adjustment/change etc)    She is compliant with disulfiram and naltrexone        Medications, allergies, PMH, PSH, SOCH, [address] reviewed and updated per routine protocol, see chart for review and changes if not noted here. ROS  A 12 point review of systems was negative except as noted here or in the HPI. Objective:   Vital Signs: (As obtained by patient/caregiver at home)  Patient-Reported Vitals 9/30/2022   Patient-Reported Weight 140lb   Patient-Reported Systolic  (No Data)   Patient-Reported Diastolic -   Patient-Reported LMP -      No acute distress  Speaking in full sentences  No apparent respiratory distress  Appears stressed    We discussed the expected course, resolution and complications of the diagnosis(es) in detail. Medication risks, benefits, costs, interactions, and alternatives were discussed as indicated. I advised her to contact the office if her condition worsens, changes or fails to improve as anticipated. She expressed understanding with the diagnosis(es) and plan. Patric Richardson is a 52 y.o. female who was evaluated by a video visit encounter for concerns as above. Patient identification was verified prior to start of the visit. A caregiver was present when appropriate.  Due to this being a TeleHealth encounter (During NFBUN-06 public health emergency), evaluation of the following organ systems was limited: Vitals/Constitutional/EENT/Resp/CV/GI//MS/Neuro/Skin/Heme-Lymph-Imm. Pursuant to the emergency declaration under the [address] [place] [address], [facility] waiver authority and the [employer] Act, this Virtual  Visit was conducted, with patient's (and/or legal guardian's) consent, to reduce the patient's risk of exposure to COVID-19 and provide necessary medical care. Services were provided through a video synchronous discussion virtually to substitute for in-person clinic visit. Patient and provider were located at their individual homes. Wood Ruiz MD  [facility]  01/11/23 8:27 AM     Portions of this note may have been populated using smart dictation software and may have \"sounds-like\" errors present.

## 2023-01-25 ENCOUNTER — TELEPHONE (OUTPATIENT)
Dept: FAMILY MEDICINE CLINIC | Age: 48
End: 2023-01-25

## 2023-01-25 NOTE — TELEPHONE ENCOUNTER
Mark Blakecarline from [address] is calling to say Dr Garcia Ill [facility] shows that she is only seeing male patients and that her profile needs to be updated with her provider relations rep. They are going to submit a request to the relations group on her end to reach out to you as well.     Interaction ID# [id number]  SG

## 2023-01-25 NOTE — TELEPHONE ENCOUNTER
Email sent, to both party's below, to advise and asked update Betsy.        Physician and Provider , Severo Ledesma, and   [email or web address]

## 2023-02-08 ENCOUNTER — VIRTUAL VISIT (OUTPATIENT)
Dept: FAMILY MEDICINE CLINIC | Age: 48
End: 2023-02-08
Payer: MEDICAID

## 2023-02-08 DIAGNOSIS — F41.9 ANXIETY: Primary | ICD-10-CM

## 2023-02-08 DIAGNOSIS — F51.04 PSYCHOPHYSIOLOGIC INSOMNIA: ICD-10-CM

## 2023-02-08 DIAGNOSIS — F33.1 MODERATE EPISODE OF RECURRENT MAJOR DEPRESSIVE DISORDER (HCC): ICD-10-CM

## 2023-02-08 PROCEDURE — 99214 OFFICE O/P EST MOD 30 MIN: CPT | Performed by: FAMILY MEDICINE

## 2023-02-08 RX ORDER — QUETIAPINE FUMARATE 50 MG/1
50 TABLET, FILM COATED ORAL
Qty: 90 TABLET | Refills: 0 | Status: SHIPPED | OUTPATIENT
Start: 2023-02-08

## 2023-02-08 NOTE — PROGRESS NOTES
Randee Jacobs is a 52 y.o. female who was seen by synchronous (real-time) audio-video technology on 2/8/2023. Consent: Randee Jacobs, who was seen by synchronous (real-time) audio-video technology, and/or her healthcare decision maker, is aware that this patient-initiated, Telehealth encounter on 2/8/2023 is a billable service, with coverage as determined by her insurance carrier. She is aware that she may receive a bill and has provided verbal consent to proceed: Yes. Assessment & Plan:       ICD-10-CM ICD-9-CM    1. Anxiety  F41.9 300.00 QUEtiapine (SEROquel) 50 mg tablet      2. Moderate episode of recurrent major depressive disorder (HCC)  F33.1 296.32 QUEtiapine (SEROquel) 50 mg tablet      3. Psychophysiologic insomnia  F51.04 307.42 QUEtiapine (SEROquel) 50 mg tablet        Seroquel not working as well  Trial moving xr to ir and see if sleep is improved but mood stability maintained  Recheck in about 2-3 mo  Call if needing to be seen sooner          Pt was counseled on risks, benefits and alternatives of treatment options. All questions were asked and answered and the patient was agreeable with the treatment plan as outlined. Subjective:    Randee Jacobs is a 52 y.o. female who was seen for Medication Evaluation (Patient states that she doesn't feel like the Seroquel is effective anymore.), Depression (Follow up.), and Anxiety (Follow up.)      Patient tells me she thinks the seroquel is less effective feeling than it was before  Stopped taking it the past few days, but she's been exhausted    Wt Readings from Last 3 Encounters:   10/28/22 131 lb (59.4 kg)   04/08/21 140 lb 10.5 oz (63.8 kg)   11/02/18 127 lb (57.6 kg)     She is exercising, she's on 2 days / wk right now, hoping to get back to 3 days a week  She's enjoying walking  She's trying to eat mindfully as well and has a goal of moderate weight loss        Medications, allergies, PMH, PSH, SOCH, [address] [place] [address] reviewed and updated per routine protocol, see chart for review and changes if not noted here. ROS  A 12 point review of systems was negative except as noted here or in the HPI. Objective:   Vital Signs: (As obtained by patient/caregiver at home)  Patient-Reported Vitals 2/8/2023   Patient-Reported Weight 136 lbs   Patient-Reported Systolic  -   Patient-Reported Diastolic -   Patient-Reported LMP 01/23/23        [INSTRUCTIONS:  \"[x]\" Indicates a positive item  \"[]\" Indicates a negative item  -- DELETE ALL ITEMS NOT EXAMINED]    Constitutional: [x] Appears well-developed and well-nourished [x] No apparent distress      [] Abnormal -     Mental status: [x] Alert and awake  [x] Oriented to person/place/time [x] Able to follow commands    [] Abnormal -     Eyes:   EOM    [x]  Normal    [] Abnormal -   Sclera  [x]  Normal    [] Abnormal -          Discharge [x]  None visible   [] Abnormal -     HENT: [x] Normocephalic, atraumatic  [] Abnormal -   [x] Mouth/Throat: Mucous membranes are moist    External Ears [x] Normal  [] Abnormal -    Neck: [x] No visualized mass [] Abnormal -     Pulmonary/Chest: [x] Respiratory effort normal   [x] No visualized signs of difficulty breathing or respiratory distress        [] Abnormal -      Musculoskeletal:   [] Normal gait with no signs of ataxia         [x] Normal range of motion of neck        [] Abnormal -     Neurological:        [x] No Facial Asymmetry (Cranial nerve 7 motor function) (limited exam due to video visit)          [x] No gaze palsy        [] Abnormal -          Skin:        [x] No significant exanthematous lesions or discoloration noted on facial skin         [] Abnormal -            Psychiatric:       [x] Normal Affect [] Abnormal -        [x] No Hallucinations    Other pertinent observable physical exam findings:seated in car    We discussed the expected course, resolution and complications of the diagnosis(es) in detail.   Medication risks, benefits, costs, interactions, and alternatives were discussed as indicated. I advised her to contact the office if her condition worsens, changes or fails to improve as anticipated. She expressed understanding with the diagnosis(es) and plan. Елена Hernandez is a 52 y.o. female who was evaluated by a video visit encounter for concerns as above. Patient identification was verified prior to start of the visit. A caregiver was present when appropriate. Due to this being a TeleHealth encounter (During [facility] public health emergency), evaluation of the following organ systems was limited: Vitals/Constitutional/EENT/Resp/CV/GI//MS/Neuro/Skin/Heme-Lymph-Imm. Pursuant to the emergency declaration under the [facility] waiver authority and the [employer] Act, this Virtual  Visit was conducted, with patient's (and/or legal guardian's) consent, to reduce the patient's risk of exposure to COVID-19 and provide necessary medical care. Services were provided through a video synchronous discussion virtually to substitute for in-person clinic visit. Patient and provider were located at their individual homes. Lilian Fish MD  [facility]  02/08/23 9:44 AM     Portions of this note may have been populated using smart dictation software and may have \"sounds-like\" errors present.

## 2023-02-08 NOTE — PROGRESS NOTES
Chief Complaint   Patient presents with    Medication Evaluation     Patient states that she doesn't feel like the Seroquel is effective anymore. Depression     Follow up. Anxiety     Follow up. 1. Have you been to the ER, urgent care clinic since your last visit? Hospitalized since your last visit? No    2. Have you seen or consulted any other health care providers outside of the [address] [place] [address] since your last visit? Include any pap smears or colon screening.  No

## 2023-02-28 ENCOUNTER — TELEPHONE (OUTPATIENT)
Dept: FAMILY MEDICINE CLINIC | Age: 48
End: 2023-02-28

## 2023-02-28 NOTE — TELEPHONE ENCOUNTER
Pt calling to get a referral.     [facility].   Phone [phone]

## 2023-03-02 NOTE — TELEPHONE ENCOUNTER
Called pt, and left a voice message, asking that she call the office back in regards to her referral.

## 2023-03-07 NOTE — TELEPHONE ENCOUNTER
Called pt, and left a voice message, asking that she call the office back in regards to her referral when able.

## 2023-04-26 ENCOUNTER — VIRTUAL VISIT (OUTPATIENT)
Dept: FAMILY MEDICINE CLINIC | Age: 48
End: 2023-04-26
Payer: MEDICAID

## 2023-04-26 DIAGNOSIS — F10.21 ALCOHOLISM IN RECOVERY (HCC): ICD-10-CM

## 2023-04-26 DIAGNOSIS — F41.9 ANXIETY: Primary | ICD-10-CM

## 2023-04-26 DIAGNOSIS — F33.2 MDD (MAJOR DEPRESSIVE DISORDER), RECURRENT SEVERE, WITHOUT PSYCHOSIS (HCC): ICD-10-CM

## 2023-04-26 DIAGNOSIS — F33.1 MODERATE EPISODE OF RECURRENT MAJOR DEPRESSIVE DISORDER (HCC): ICD-10-CM

## 2023-04-26 DIAGNOSIS — F51.04 PSYCHOPHYSIOLOGIC INSOMNIA: ICD-10-CM

## 2023-04-26 DIAGNOSIS — Z79.899 CHRONIC PRESCRIPTION BENZODIAZEPINE USE: ICD-10-CM

## 2023-04-26 DIAGNOSIS — F41.0 PANIC DISORDER WITHOUT AGORAPHOBIA: ICD-10-CM

## 2023-04-26 PROCEDURE — 99215 OFFICE O/P EST HI 40 MIN: CPT | Performed by: FAMILY MEDICINE

## 2023-04-26 RX ORDER — QUETIAPINE FUMARATE 50 MG/1
50 TABLET, EXTENDED RELEASE ORAL DAILY
Qty: 90 TABLET | Refills: 0 | Status: SHIPPED | OUTPATIENT
Start: 2023-04-26

## 2023-04-26 NOTE — PROGRESS NOTES
Pablito Gibson is a 52 y.o. female who was seen by synchronous (real-time) audio-video technology on 4/26/2023. Consent: Pablito Gibson, who was seen by synchronous (real-time) audio-video technology, and/or her healthcare decision maker, is aware that this patient-initiated, Telehealth encounter on 4/26/2023 is a billable service, with coverage as determined by her insurance carrier. She is aware that she may receive a bill and has provided verbal consent to proceed: Yes. Assessment & Plan:       ICD-10-CM ICD-9-CM    1. Anxiety  F41.9 300.00       2. Psychophysiologic insomnia  F51.04 307.42       3. Moderate episode of recurrent major depressive disorder (HCC)  F33.1 296.32       4. Alcoholism in recovery ([facility] [address]  F10.21 303.93       5. MDD (major depressive disorder), recurrent severe, without psychosis ([facility] [address]  F33.2 296.33       6. Panic disorder without agoraphobia  F41.0 300.01         Pt tells me she is not taking seroquel because of her side effects to the IR  She is going to go back to the XR  She has not scheduled with therapy recently, recommend she do that  She should establish with psychiatry, ref to [facility] was placed today for both counseling and also med mgmt services  She is to remain committed to sobriety  She has the number for crisis in her phone and on her fridge  We will have short interval recheck  On the date of visit paperwork for job was completed and reviewed with patient and was faxed as well        Pt was counseled on risks, benefits and alternatives of treatment options. All questions were asked and answered and the patient was agreeable with the treatment plan as outlined. Total time on the date of encounter exceeded +64 minutes and included patient care, coordination of care, charting and preparation for visit. Subjective:    Pablito Gibson is a 52 y.o. female who was seen for Depression (Follow up - discuss FMLA for depression )      See abby message  Patient is under a lot of stress with her special needs son and everything going on in her life, she feels like she hit a breaking point  She is having worsening symptoms of anxiety and depression  She's finding it is difficult to function    She is not in counseling right now  Sobriety is stable, >2 years from etoh she reports    Taking buspar 30 bid  Taking her clonazepam more regularly  Tried going off of her seroquel, she hasn't been taking that since our last visit she tells me--the IR gave side effects, the xr helped with mood but not as much sleep    Wellbutrin 300 xr every morning  Cymbalta 120 mg at bedtime    She had previously called [address] but they had told her she could not have her clonazepam prescribed    She tells me she right now cannot drive, she cannot work, she feels extremely frustrated and uncontrolled      Medications, allergies, PMH, PSH, SOCH, ANDREA GEE OF [facility] reviewed and updated per routine protocol, see chart for review and changes if not noted here. ROS  A 12 point review of systems was negative except as noted here or in the HPI.     Objective:   Vital Signs: (As obtained by patient/caregiver at home)  Patient-Reported Vitals 2/8/2023   Patient-Reported Weight 136 lbs   Patient-Reported Systolic  -   Patient-Reported Diastolic -   Patient-Reported LMP 01/23/23        [INSTRUCTIONS:  \"[x]\" Indicates a positive item  \"[]\" Indicates a negative item  -- DELETE ALL ITEMS NOT EXAMINED]    Constitutional: [x] Appears well-developed and well-nourished [x] No apparent distress      [] Abnormal -     Mental status: [x] Alert and awake  [x] Oriented to person/place/time [x] Able to follow commands    [] Abnormal -     Eyes:   EOM    [x]  Normal    [] Abnormal -   Sclera  [x]  Normal    [] Abnormal -          Discharge [x]  None visible   [] Abnormal -     HENT: [x] Normocephalic, atraumatic  [] Abnormal -   [x] Mouth/Throat: Mucous membranes are moist    External Ears [x] Normal  [] Abnormal -    Neck: [x] No visualized mass [] Abnormal -     Pulmonary/Chest: [x] Respiratory effort normal   [x] No visualized signs of difficulty breathing or respiratory distress        [] Abnormal -      Musculoskeletal:   [] Normal gait with no signs of ataxia         [x] Normal range of motion of neck        [] Abnormal -     Neurological:        [x] No Facial Asymmetry (Cranial nerve 7 motor function) (limited exam due to video visit)          [x] No gaze palsy        [] Abnormal -          Skin:        [x] No significant exanthematous lesions or discoloration noted on facial skin         [] Abnormal -            Psychiatric:       [] Normal Affect [] Abnormal -        [x] No Hallucinations    Other pertinent observable physical exam findings:anxious appearing, frequent eye blinking    We discussed the expected course, resolution and complications of the diagnosis(es) in detail. Medication risks, benefits, costs, interactions, and alternatives were discussed as indicated. I advised her to contact the office if her condition worsens, changes or fails to improve as anticipated. She expressed understanding with the diagnosis(es) and plan. Kalia Mcintosh is a 52 y.o. female who was evaluated by a video visit encounter for concerns as above. Patient identification was verified prior to start of the visit. A caregiver was present when appropriate. Due to this being a TeleHealth encounter (During NTSFI-11 public health emergency), evaluation of the following organ systems was limited: Vitals/Constitutional/EENT/Resp/CV/GI//MS/Neuro/Skin/Heme-Lymph-Imm. Pursuant to the emergency declaration under the [facility] waiver authority and the [employer] Act, this Virtual  Visit was conducted, with patient's (and/or legal guardian's) consent, to reduce the patient's risk of exposure to COVID-19 and provide necessary medical care.      Services were provided through a video synchronous discussion virtually to substitute for in-person clinic visit. Patient and provider were located at their individual homes. Reinaldo Tang MD  [facility]  04/26/23 8:29 AM     Portions of this note may have been populated using smart dictation software and may have \"sounds-like\" errors present.

## 2023-05-03 ENCOUNTER — VIRTUAL VISIT (OUTPATIENT)
Dept: FAMILY MEDICINE CLINIC | Age: 48
End: 2023-05-03
Payer: MEDICAID

## 2023-05-03 DIAGNOSIS — F10.21 ALCOHOLISM IN RECOVERY (HCC): ICD-10-CM

## 2023-05-03 DIAGNOSIS — F33.1 MODERATE EPISODE OF RECURRENT MAJOR DEPRESSIVE DISORDER (HCC): ICD-10-CM

## 2023-05-03 DIAGNOSIS — F41.0 PANIC DISORDER WITHOUT AGORAPHOBIA: ICD-10-CM

## 2023-05-03 DIAGNOSIS — F41.9 ANXIETY: Primary | ICD-10-CM

## 2023-05-03 DIAGNOSIS — F51.04 PSYCHOPHYSIOLOGIC INSOMNIA: ICD-10-CM

## 2023-05-03 PROCEDURE — 99214 OFFICE O/P EST MOD 30 MIN: CPT | Performed by: FAMILY MEDICINE

## 2023-05-03 RX ORDER — PROPRANOLOL HYDROCHLORIDE 10 MG/1
TABLET ORAL
Qty: 180 TABLET | Refills: 1 | Status: SHIPPED | OUTPATIENT
Start: 2023-05-03

## 2023-05-15 ENCOUNTER — PATIENT MESSAGE (OUTPATIENT)
Facility: CLINIC | Age: 48
End: 2023-05-15

## 2023-05-15 DIAGNOSIS — F41.0 PANIC DISORDER (EPISODIC PAROXYSMAL ANXIETY): ICD-10-CM

## 2023-05-15 DIAGNOSIS — Z79.899 CHRONIC PRESCRIPTION BENZODIAZEPINE USE: Primary | ICD-10-CM

## 2023-05-15 DIAGNOSIS — F43.10 PANIC ATTACK DUE TO POST TRAUMATIC STRESS DISORDER (PTSD): ICD-10-CM

## 2023-05-15 DIAGNOSIS — F41.9 ANXIETY: ICD-10-CM

## 2023-05-15 DIAGNOSIS — F41.0 PANIC ATTACK DUE TO POST TRAUMATIC STRESS DISORDER (PTSD): ICD-10-CM

## 2023-05-15 RX ORDER — CLONAZEPAM 0.5 MG/1
0.5 TABLET ORAL 4 TIMES DAILY
Qty: 120 TABLET | Refills: 2 | Status: SHIPPED | OUTPATIENT
Start: 2023-05-15 | End: 2023-08-13

## 2023-05-15 NOTE — TELEPHONE ENCOUNTER
From: Mortimer Cree  To: Dr. Meyer Kidney: 5/15/2023 9:32 AM EDT  Subject: Need refill for clonazepam 0.5 mg    Dear Dr. Alfredo Waggoner,  I went to refill my clonazepam and realized I have 0 refills. I barely have any left and I apologize for not realizing this sooner. If you could send this to [facility] off [address] I would greatly appreciate it.

## 2023-05-27 DIAGNOSIS — F41.9 ANXIETY DISORDER, UNSPECIFIED: ICD-10-CM

## 2023-05-27 DIAGNOSIS — F41.0 PANIC DISORDER (EPISODIC PAROXYSMAL ANXIETY): ICD-10-CM

## 2023-05-30 RX ORDER — PROPRANOLOL HYDROCHLORIDE 10 MG/1
TABLET ORAL
Qty: 180 TABLET | Refills: 1 | Status: SHIPPED | OUTPATIENT
Start: 2023-05-30

## 2023-06-05 ENCOUNTER — TELEMEDICINE (OUTPATIENT)
Facility: CLINIC | Age: 48
End: 2023-06-05
Payer: MEDICAID

## 2023-06-05 DIAGNOSIS — F43.10 PANIC ATTACK DUE TO POST TRAUMATIC STRESS DISORDER (PTSD): ICD-10-CM

## 2023-06-05 DIAGNOSIS — Z79.899 CHRONIC PRESCRIPTION BENZODIAZEPINE USE: ICD-10-CM

## 2023-06-05 DIAGNOSIS — F10.21 ALCOHOLISM IN RECOVERY (HCC): Primary | ICD-10-CM

## 2023-06-05 DIAGNOSIS — F41.0 PANIC ATTACK DUE TO POST TRAUMATIC STRESS DISORDER (PTSD): ICD-10-CM

## 2023-06-05 DIAGNOSIS — F33.9 EPISODE OF RECURRENT MAJOR DEPRESSIVE DISORDER, UNSPECIFIED DEPRESSION EPISODE SEVERITY (HCC): ICD-10-CM

## 2023-06-05 DIAGNOSIS — F42.9 OBSESSIVE-COMPULSIVE DISORDER, UNSPECIFIED TYPE: ICD-10-CM

## 2023-06-05 DIAGNOSIS — F41.9 ANXIETY: ICD-10-CM

## 2023-06-05 PROCEDURE — 99215 OFFICE O/P EST HI 40 MIN: CPT | Performed by: FAMILY MEDICINE

## 2023-06-05 RX ORDER — ESCITALOPRAM OXALATE 10 MG/1
TABLET ORAL
COMMUNITY
Start: 2023-05-16

## 2023-06-05 RX ORDER — TRAZODONE HYDROCHLORIDE 50 MG/1
TABLET ORAL
COMMUNITY
Start: 2023-05-16

## 2023-06-05 ASSESSMENT — PATIENT HEALTH QUESTIONNAIRE - PHQ9
SUM OF ALL RESPONSES TO PHQ9 QUESTIONS 1 & 2: 2
SUM OF ALL RESPONSES TO PHQ QUESTIONS 1-9: 2
SUM OF ALL RESPONSES TO PHQ QUESTIONS 1-9: 2
2. FEELING DOWN, DEPRESSED OR HOPELESS: 1
SUM OF ALL RESPONSES TO PHQ QUESTIONS 1-9: 2
SUM OF ALL RESPONSES TO PHQ QUESTIONS 1-9: 2
1. LITTLE INTEREST OR PLEASURE IN DOING THINGS: 1

## 2023-06-05 NOTE — PROGRESS NOTES
Chief Complaint   Patient presents with    Anxiety     Follow up. Discuss Medications     1. Have you been to the ER, urgent care clinic since your last visit? Hospitalized since your last visit? No    2. Have you seen or consulted any other health care providers outside of the [address] [place] [address] since your last visit? Include any pap smears or colon screening.  No

## 2023-06-05 NOTE — PROGRESS NOTES
Rickey Batista is a 52 y.o. female who was seen by synchronous (real-time) audio-video technology on 6/5/2023. Consent: Rickey Batista, who was seen by synchronous (real-time) audio-video technology, and/or her healthcare decision maker, is aware that this patient-initiated, Telehealth encounter on 6/5/2023 is a billable service, with coverage as determined by her insurance carrier. She is aware that she may receive a bill and has provided verbal consent to proceed: Yes. Assessment & Plan:      Diagnosis Orders   1. Alcoholism in recovery ([facility] [address]        2. Anxiety        3. Episode of recurrent major depressive disorder, unspecified depression episode severity ([facility] [address]        4. Obsessive-compulsive disorder, unspecified type        5. Panic attack due to post traumatic stress disorder (PTSD)        6. Chronic prescription benzodiazepine use            Patient management is now per psychiatry  Please follow your psychiatrists plans or contact him with your concerns  Do not recommend you adjust you rmedications without his awareness  Continue therapy / counseling  Reocmmend future fmla/disability paperwork per psychiatry  Recommend fu PRN with me  Paperwork filled out today      Pt was counseled on risks, benefits and alternatives of treatment options. All questions were asked and answered and the patient was agreeable with the treatment plan as outlined. Total time on the date of encounter exceeded 46 minutes and included patient care, coordination of care, charting and preparation for visit. Subjective:    Rickey Batista is a 52 y.o. female who was seen for Anxiety (Follow up.) and Discuss Medications      Since our last visit Ike established with psychiatry as we discussed  She tells me her medications were changed  Clonazepam tapered to TID dosing  Holding seroqeul, naltrexone, cymbalta and antabuse she reports  Was given lexapro    She tells me she was making changes to re establish a baseline  -she says she

## 2023-06-27 DIAGNOSIS — F41.0 PANIC DISORDER (EPISODIC PAROXYSMAL ANXIETY): ICD-10-CM

## 2023-06-27 DIAGNOSIS — F41.9 ANXIETY DISORDER, UNSPECIFIED: ICD-10-CM

## 2023-06-27 RX ORDER — PROPRANOLOL HYDROCHLORIDE 10 MG/1
TABLET ORAL
Qty: 180 TABLET | Refills: 1 | Status: SHIPPED | OUTPATIENT
Start: 2023-06-27

## 2023-07-10 RX ORDER — QUETIAPINE FUMARATE 50 MG/1
50 TABLET, FILM COATED ORAL NIGHTLY
Qty: 60 TABLET | OUTPATIENT
Start: 2023-07-10

## 2023-07-10 NOTE — TELEPHONE ENCOUNTER
Attempted to contact patient at number on file, no answer, left message on patient's personal VM to return call to office to obtain message from Dr. Santa Adames about her Rx refill request.

## 2023-07-10 NOTE — TELEPHONE ENCOUNTER
Pt is now seeing psychiatry, recommend all mental health meds be managed by psychiatry for continuity.  Please confirm pt is aware and agrees to this plan

## 2023-08-06 DIAGNOSIS — F41.9 ANXIETY DISORDER, UNSPECIFIED: ICD-10-CM

## 2023-08-06 DIAGNOSIS — F41.0 PANIC DISORDER (EPISODIC PAROXYSMAL ANXIETY): ICD-10-CM

## 2023-08-07 RX ORDER — PROPRANOLOL HYDROCHLORIDE 10 MG/1
TABLET ORAL
Qty: 180 TABLET | Refills: 1 | Status: SHIPPED | OUTPATIENT
Start: 2023-08-07

## 2023-08-31 DIAGNOSIS — F41.9 ANXIETY DISORDER, UNSPECIFIED: ICD-10-CM

## 2023-08-31 DIAGNOSIS — F41.0 PANIC DISORDER (EPISODIC PAROXYSMAL ANXIETY): ICD-10-CM

## 2023-08-31 RX ORDER — PROPRANOLOL HYDROCHLORIDE 10 MG/1
TABLET ORAL
Qty: 180 TABLET | Refills: 1 | Status: SHIPPED | OUTPATIENT
Start: 2023-08-31

## 2023-09-12 ENCOUNTER — TELEMEDICINE (OUTPATIENT)
Facility: CLINIC | Age: 48
End: 2023-09-12
Payer: MEDICAID

## 2023-09-12 ENCOUNTER — TELEPHONE (OUTPATIENT)
Facility: CLINIC | Age: 48
End: 2023-09-12

## 2023-09-12 DIAGNOSIS — U07.1 COVID-19: Primary | ICD-10-CM

## 2023-09-12 PROCEDURE — 99213 OFFICE O/P EST LOW 20 MIN: CPT | Performed by: FAMILY MEDICINE

## 2023-09-12 SDOH — ECONOMIC STABILITY: FOOD INSECURITY: WITHIN THE PAST 12 MONTHS, THE FOOD YOU BOUGHT JUST DIDN'T LAST AND YOU DIDN'T HAVE MONEY TO GET MORE.: SOMETIMES TRUE

## 2023-09-12 SDOH — ECONOMIC STABILITY: FOOD INSECURITY: WITHIN THE PAST 12 MONTHS, YOU WORRIED THAT YOUR FOOD WOULD RUN OUT BEFORE YOU GOT MONEY TO BUY MORE.: SOMETIMES TRUE

## 2023-09-12 SDOH — ECONOMIC STABILITY: INCOME INSECURITY: HOW HARD IS IT FOR YOU TO PAY FOR THE VERY BASICS LIKE FOOD, HOUSING, MEDICAL CARE, AND HEATING?: NOT HARD AT ALL

## 2023-09-12 SDOH — ECONOMIC STABILITY: HOUSING INSECURITY
IN THE LAST 12 MONTHS, WAS THERE A TIME WHEN YOU DID NOT HAVE A STEADY PLACE TO SLEEP OR SLEPT IN A SHELTER (INCLUDING NOW)?: NO

## 2023-09-12 ASSESSMENT — ENCOUNTER SYMPTOMS
WHEEZING: 0
COUGH: 1
SORE THROAT: 0
SHORTNESS OF BREATH: 0

## 2023-09-12 NOTE — PROGRESS NOTES
Chief Complaint   Patient presents with    Positive For Covid-19     09/09/2023  Fatigue, lack of appetite body aches
is normal.      Breath sounds: Normal breath sounds. Neurological:      Mental Status: She is alert and oriented to person, place, and time.               --Karen Merrill MD

## 2023-10-09 DIAGNOSIS — F41.0 PANIC DISORDER (EPISODIC PAROXYSMAL ANXIETY): ICD-10-CM

## 2023-10-09 DIAGNOSIS — F41.9 ANXIETY DISORDER, UNSPECIFIED: ICD-10-CM

## 2023-10-09 RX ORDER — PROPRANOLOL HYDROCHLORIDE 10 MG/1
TABLET ORAL
Qty: 540 TABLET | Refills: 1 | Status: SHIPPED | OUTPATIENT
Start: 2023-10-09

## 2023-10-31 ENCOUNTER — PATIENT MESSAGE (OUTPATIENT)
Facility: CLINIC | Age: 48
End: 2023-10-31

## 2023-10-31 DIAGNOSIS — F60.9 PERSONALITY DISORDER (HCC): Primary | ICD-10-CM

## 2023-10-31 DIAGNOSIS — F41.0 PANIC ATTACK DUE TO POST TRAUMATIC STRESS DISORDER (PTSD): ICD-10-CM

## 2023-10-31 DIAGNOSIS — F43.10 PANIC ATTACK DUE TO POST TRAUMATIC STRESS DISORDER (PTSD): ICD-10-CM

## 2023-10-31 DIAGNOSIS — F10.21 ALCOHOLISM IN RECOVERY (HCC): ICD-10-CM

## 2023-10-31 DIAGNOSIS — Z79.899 CHRONIC PRESCRIPTION BENZODIAZEPINE USE: ICD-10-CM

## 2023-10-31 DIAGNOSIS — F41.9 ANXIETY: ICD-10-CM

## 2023-10-31 DIAGNOSIS — F33.9 EPISODE OF RECURRENT MAJOR DEPRESSIVE DISORDER, UNSPECIFIED DEPRESSION EPISODE SEVERITY (HCC): ICD-10-CM

## 2023-10-31 DIAGNOSIS — F42.9 OBSESSIVE-COMPULSIVE DISORDER, UNSPECIFIED TYPE: ICD-10-CM

## 2023-12-20 NOTE — TELEPHONE ENCOUNTER
90 day refill request for Bupropion HCL  mg tablets, Quetiapine ER 50 mg tablets, and Duloxetine HCL DR 60 mg caps faxed by [facility] pharmacy on [address]. Please pend medications to provider.   Jo-Ann

## 2023-12-21 RX ORDER — DULOXETIN HYDROCHLORIDE 60 MG/1
90 CAPSULE, DELAYED RELEASE ORAL DAILY
Qty: 30 CAPSULE | Refills: 0 | Status: CANCELLED | OUTPATIENT
Start: 2023-12-21

## 2023-12-21 RX ORDER — BUPROPION HYDROCHLORIDE 300 MG/1
300 TABLET ORAL DAILY
Qty: 30 TABLET | Refills: 0 | Status: CANCELLED | OUTPATIENT
Start: 2023-12-21

## 2023-12-22 NOTE — TELEPHONE ENCOUNTER
Get clarity please from patient  Patient is seeing psychiatry and I believe all meds for psychiatry then should be under one prescriber--the psychiatrist--can we make sure to check with blayen about this  Leonel Holland routing directly to you for patient privacy

## 2023-12-26 NOTE — TELEPHONE ENCOUNTER
Called pt, and left a voice message, asking that she call the office back in regards to her medication when able.

## 2024-03-26 ENCOUNTER — TELEPHONE (OUTPATIENT)
Facility: CLINIC | Age: 49
End: 2024-03-26

## 2024-03-26 NOTE — TELEPHONE ENCOUNTER
[facility] paperwork was received, it had originally been faxed to  Erika Smith MD,  Patient has not been seen by me except virtually last June, since then has not been seen in this practice  On chart review it appears that the patient has been following with Dr. Amarjit Cortes psychiatrist at [facility] here in [place].  We can offer to forward the paperwork to Dr. Cortes practice if her absence from work is related to any of the conditions Dr. Cortes is managing since I have not seen the patient recently and also I am leaving the practice with tomorrow being my last working day so will be unable to see her for care.    Soila Overton MD  [facility]  03/26/24 1:02 PM

## 2025-04-09 ENCOUNTER — HOSPITAL ENCOUNTER (OUTPATIENT)
Facility: HOSPITAL | Age: 50
Setting detail: OUTPATIENT SURGERY
Discharge: HOME | End: 2025-04-09
Attending: INTERNAL MEDICINE | Admitting: INTERNAL MEDICINE
Payer: MEDICAID

## 2025-04-09 ENCOUNTER — ANESTHESIA EVENT (OUTPATIENT)
Facility: HOSPITAL | Age: 50
End: 2025-04-09
Payer: MEDICAID

## 2025-04-09 ENCOUNTER — ANESTHESIA (OUTPATIENT)
Facility: HOSPITAL | Age: 50
End: 2025-04-09
Payer: MEDICAID

## 2025-04-09 VITALS
HEIGHT: 63 IN | RESPIRATION RATE: 14 BRPM | BODY MASS INDEX: 25.7 KG/M2 | OXYGEN SATURATION: 100 % | SYSTOLIC BLOOD PRESSURE: 110 MMHG | TEMPERATURE: 97.5 F | WEIGHT: 145.06 LBS | HEART RATE: 73 BPM | DIASTOLIC BLOOD PRESSURE: 77 MMHG

## 2025-04-09 PROCEDURE — 3700000000 HC ANESTHESIA ATTENDED CARE: Performed by: INTERNAL MEDICINE

## 2025-04-09 PROCEDURE — 2709999900 HC NON-CHARGEABLE SUPPLY: Performed by: INTERNAL MEDICINE

## 2025-04-09 PROCEDURE — 88305 TISSUE EXAM BY PATHOLOGIST: CPT

## 2025-04-09 PROCEDURE — 7100000011 HC PHASE II RECOVERY - ADDTL 15 MIN: Performed by: INTERNAL MEDICINE

## 2025-04-09 PROCEDURE — 6360000002 HC RX W HCPCS: Performed by: NURSE ANESTHETIST, CERTIFIED REGISTERED

## 2025-04-09 PROCEDURE — 2580000003 HC RX 258: Performed by: INTERNAL MEDICINE

## 2025-04-09 PROCEDURE — 3700000001 HC ADD 15 MINUTES (ANESTHESIA): Performed by: INTERNAL MEDICINE

## 2025-04-09 PROCEDURE — 3600007512: Performed by: INTERNAL MEDICINE

## 2025-04-09 PROCEDURE — 3600007502: Performed by: INTERNAL MEDICINE

## 2025-04-09 PROCEDURE — 7100000010 HC PHASE II RECOVERY - FIRST 15 MIN: Performed by: INTERNAL MEDICINE

## 2025-04-09 RX ORDER — QUETIAPINE FUMARATE 300 MG/1
400 TABLET, FILM COATED ORAL NIGHTLY
COMMUNITY

## 2025-04-09 RX ORDER — SODIUM CHLORIDE 0.9 % (FLUSH) 0.9 %
5-40 SYRINGE (ML) INJECTION PRN
Status: DISCONTINUED | OUTPATIENT
Start: 2025-04-09 | End: 2025-04-09 | Stop reason: HOSPADM

## 2025-04-09 RX ORDER — SODIUM CHLORIDE 0.9 % (FLUSH) 0.9 %
5-40 SYRINGE (ML) INJECTION EVERY 12 HOURS SCHEDULED
Status: DISCONTINUED | OUTPATIENT
Start: 2025-04-09 | End: 2025-04-09 | Stop reason: HOSPADM

## 2025-04-09 RX ORDER — GABAPENTIN 300 MG/1
300 CAPSULE ORAL 3 TIMES DAILY
COMMUNITY

## 2025-04-09 RX ORDER — PROPOFOL 10 MG/ML
INJECTION, EMULSION INTRAVENOUS
Status: DISCONTINUED | OUTPATIENT
Start: 2025-04-09 | End: 2025-04-09 | Stop reason: SDUPTHER

## 2025-04-09 RX ORDER — SODIUM CHLORIDE 9 MG/ML
INJECTION, SOLUTION INTRAVENOUS PRN
Status: DISCONTINUED | OUTPATIENT
Start: 2025-04-09 | End: 2025-04-09 | Stop reason: HOSPADM

## 2025-04-09 RX ADMIN — PROPOFOL 200 MCG/KG/MIN: 10 INJECTION, EMULSION INTRAVENOUS at 09:42

## 2025-04-09 RX ADMIN — PROPOFOL 30 MG: 10 INJECTION, EMULSION INTRAVENOUS at 09:44

## 2025-04-09 RX ADMIN — PROPOFOL 150 MG: 10 INJECTION, EMULSION INTRAVENOUS at 09:43

## 2025-04-09 RX ADMIN — SODIUM CHLORIDE: 9 INJECTION, SOLUTION INTRAVENOUS at 09:37

## 2025-04-09 ASSESSMENT — PAIN - FUNCTIONAL ASSESSMENT: PAIN_FUNCTIONAL_ASSESSMENT: 0-10

## 2025-04-09 ASSESSMENT — LIFESTYLE VARIABLES: SMOKING_STATUS: 1

## 2025-04-09 NOTE — PERIOP NOTE
Received recovery report from anesthesia team, see anesthesia note. Abdomen remains soft and non-tender post-procedure. Pt has no complaints at this time and tolerated procedure well. Endoscope was pre-cleaned at the bedside by Ze Rueda immediately following procedure. Post recovery report given to Tory Palomo.

## 2025-04-09 NOTE — OP NOTE
Operative Note      Patient: Ike Garrett  YOB: 1975  MRN: [id number]    Date of Procedure: 2025    Pre-Op Diagnosis Codes:      * Colon cancer screening [Z12.11]    [facility]  Riccardo Herndon MD  [phone]            2025          Colonoscopy Operative Report  Ike Garrett  :  1975  [facility] Medical Record Number:  [id number]      Indications:  positive cologuard    :  Riccardo Herndon MD    Referring Provider: Tracy Farias    Sedation:  MAC    Pre-Procedural Exam:      Airway: clear,  No airway problems anticipated  Heart: RRR, without gallops or rubs  Lungs: clear bilaterally without wheezes, crackles, or rhonchi  Abdomen: soft, nontender, nondistended, bowel sounds present  Mental Status: awake, alert and oriented to person, place and time     Procedure Details:  After informed consent was obtained with all risks and benefits of procedure explained and preoperative exam completed, the patient was taken to the endoscopy suite and placed in the left lateral decubitus position.  Upon sequential sedation as per above, a digital rectal exam was performed. The Olympus videocolonoscope  was inserted in the rectum and carefully advanced to the terminal ileum.  The quality of preparation was good.  The colonoscope was slowly withdrawn with careful inspection and evaluation between folds. Retroflexion in the rectum was performed.  The patient tolerated the procedure well.    Findings:   Terminal Ileum: Normal  Cecum: Normal  Ascending Colon: Mild diverticulosis.  Transverse Colon: Normal  Descending Colon: Diverticulosis.  6mm sessile polyp.  Resected and retrieved with cold snare.  Sigmoid: Diverticulosis.  3 6-8mm sessile polyps.  Resected with cold snare and retrieved.  Rectum:  Internal hemorrhoids.        Interventions:      Cold snare polypectomy x4    Specimen Removed:     Descending colon polyp  Sigmoid colon polyps    Complications:

## 2025-04-09 NOTE — H&P
[facility]  Riccardo Herndon M.D.  [phone]            History and Physical       NAME:  Ike Garrett   :   1975   MRN:   [id number]       Referring Physician:  Tracy Farias      Consult Date: 2025 8:18 AM    Chief Complaint:  positive cologuard    History of Present Illness:  Patient is a 49 y.o. who is seen for positive cologuard. Denies any ongoing GI complaints.      PMH:  Past Medical History:   Diagnosis Date    Depression     ETOH abuse     Factor 5 Leiden mutation, heterozygous     Factor 5 Leiden mutation, heterozygous        PSH:  Past Surgical History:   Procedure Laterality Date     SECTION      CHOLECYSTECTOMY         Allergies:  Allergies   Allergen Reactions    Venlafaxine Other (See Comments)     Brain zaps, hot flashes, worsening symptoms    Fluvoxamine Anxiety and Palpitations       Home Medications:  Prior to Admission Medications   Prescriptions Last Dose Informant Patient Reported? Taking?   Cholecalciferol 50 MCG ( UT) CAPS   Yes No   Sig: Take by mouth 2 times daily   DULoxetine (CYMBALTA) 60 MG extended release capsule   Yes No   Sig: Take 90 mg by mouth daily   buPROPion (WELLBUTRIN XL) 300 MG extended release tablet   Yes No   Sig: Take 1 tablet by mouth daily   clonazePAM (KLONOPIN) 0.5 MG tablet   No No   Sig: Take 1 tablet by mouth 4 times daily for 90 days. Max Daily Amount: 2 mg   disulfiram (ANTABUSE) 250 MG tablet   Yes No   Sig: Take 1 tablet by mouth daily   escitalopram (LEXAPRO) 10 MG tablet   Yes No   naltrexone (DEPADE) 50 MG tablet   Yes No   Sig: Take 1 tablet by mouth daily   propranolol (INDERAL) 10 MG tablet   No No   Sig: TAKE 1 TO 2 TABLETS BY MOUTH 3 TIMES A DAY AS NEEDED FOR ANXIETY/PALPATIONS   traZODone (DESYREL) 50 MG tablet   Yes No      Facility-Administered Medications: None       Hospital Medications:  No current facility-administered medications for this encounter.       Social History:  Social History  (obsessive compulsive disorder)    Seasonal affective disorder      Assessment:     Positive cologuard   Plan:     colonoscopy     Signed By: Riccardo Herndon MD     4/9/2025  8:18 AM

## 2025-04-09 NOTE — ANESTHESIA PRE PROCEDURE
Department of Anesthesiology  Preprocedure Note       Name:  Ike Garrett   Age:  49 y.o.  :  1975                                          MRN:  [id number]         Date:  2025      Surgeon: Surgeon(s):  Riccardo Herndon MD    Procedure: Procedure(s):  COLONOSCOPY DIAGNOSTIC    Medications prior to admission:   Prior to Admission medications    Medication Sig Start Date End Date Taking? Authorizing Provider   gabapentin (NEURONTIN) 300 MG capsule Take 1 capsule by mouth 3 times daily. Max Daily Amount: 900 mg   Yes ProviderSavage MD   QUEtiapine (SEROQUEL) 300 MG tablet Take 400 mg by mouth at bedtime   Yes ProviderSavage MD   escitalopram (LEXAPRO) 10 MG tablet Take 2 tablets by mouth daily 23  Yes Savage Mayorga MD   clonazePAM (KLONOPIN) 0.5 MG tablet Take 1 tablet by mouth 4 times daily for 90 days. Max Daily Amount: 2 mg 5/15/23 4/9/25 Yes Soila Overton MD   propranolol (INDERAL) 10 MG tablet TAKE 1 TO 2 TABLETS BY MOUTH 3 TIMES A DAY AS NEEDED FOR ANXIETY/PALPATIONS  Patient not taking: Reported on 2025 10/9/23   Soila Overton MD   traZODone (DESYREL) 50 MG tablet  23   ProviderSavage MD   buPROPion (WELLBUTRIN XL) 300 MG extended release tablet Take 1 tablet by mouth daily  Patient not taking: Reported on 2025   Automatic Reconciliation, Ar   Cholecalciferol 50 MCG (2000 UT) CAPS Take by mouth 2 times daily    Automatic Reconciliation, Ar   disulfiram (ANTABUSE) 250 MG tablet Take 1 tablet by mouth daily  Patient not taking: Reported on 2025   Automatic Reconciliation, Ar   DULoxetine (CYMBALTA) 60 MG extended release capsule Take 90 mg by mouth daily  Patient not taking: Reported on 2025   Automatic Reconciliation, Ar   naltrexone (DEPADE) 50 MG tablet Take 1 tablet by mouth daily  Patient not taking: Reported on 2025   Automatic Reconciliation, Ar       Current medications:    Current

## 2025-04-09 NOTE — ANESTHESIA POSTPROCEDURE EVALUATION
Department of Anesthesiology  Postprocedure Note    Patient: Ike Garrett  MRN: [id number]  YOB: 1975  Date of evaluation: 4/9/2025    Procedure Summary       Date: 04/09/25 Room / Location: [place] [address] / [facility] ENDOSCOPY    Anesthesia Start: 0937 Anesthesia Stop: 1003    Procedures:       COLONOSCOPY DIAGNOSTIC (Lower GI Region)      COLONOSCOPY POLYPECTOMY SNARE/BIOPSY (Lower GI Region) Diagnosis:       Colon cancer screening      (Colon cancer screening [Z12.11])    Surgeons: Riccardo Herndon MD Responsible Provider: Susy Arana MD    Anesthesia Type: MAC ASA Status: 2            Anesthesia Type: MAC    Antonio Phase I: Antonio Score: 10    Antonio Phase II: Antonio Score: 10    Anesthesia Post Evaluation    Patient location during evaluation: PACU  Patient participation: complete - patient participated  Level of consciousness: awake  Airway patency: patent  Nausea & Vomiting: no vomiting and no nausea  Cardiovascular status: hemodynamically stable  Respiratory status: acceptable  Hydration status: stable  Pain management: adequate    No notable events documented.

## 2025-04-09 NOTE — DISCHARGE INSTRUCTIONS
[facility]  Riccardo Herndon MD  [phone]          COLON DISCHARGE INSTRUCTIONS       2025    Ike Garrett  :  1975  [facility] Medical Record Number:  [id number]  COLONOSCOPY FINDINGS:  Your colonoscopy showed: 4 polyps which were removed and hemorrhoids..    DISCOMFORT:  Redness at IV site- apply warm compress to area; if redness or soreness persist- contact your physician  There may be a slight amount of blood passed from the rectum  Gaseous discomfort- walking, belching will help relieve any discomfort  You may not operate a vehicle for 12 hours  You may not engage in an occupation involving machinery or appliances for rest of today  You may not drink alcoholic beverages for at least 12 hours  Avoid making any critical decisions for at least 24 hour  DIET:   Advance diet as tolerated.   - however -  remember your colon is empty and a heavy meal will produce gas.   Avoid these foods:  vegetables, fried / greasy foods, carbonated drinks for today     ACTIVITY:  You may resume your normal daily activities it is recommended that you spend the remainder of the day resting -  avoid any strenuous activity.    CALL M.D.  ANY SIGN OF:   Increasing pain, nausea, vomiting  Abdominal distension (swelling)  New increased bleeding (oral or rectal)  Fever (chills)  Pain in chest area  Bloody discharge from nose or mouth   Shortness of breath    Follow-up Instructions:  Call Dr. Herndon at [phone] if you have any questions or problems.  Biopsy results will be available in about 14 days. If you have not heard about your results within 2-3 weeks, please call the office.   Should have a repeat colonoscopy in 3 years.

## (undated) DEVICE — 3M™ CUROS™ DISINFECTING CAP FOR NEEDLELESS CONNECTORS 270/CARTON 20 CARTONS/CASE CFF1-270: Brand: CUROS™

## (undated) DEVICE — SET GRAV CK VLV NEEDLESS ST 3 GANGED 4WAY STPCOCK HI FLO 10

## (undated) DEVICE — CONTAINER SPEC 20 ML LID NEUT BUFF FORMALIN 10 % POLYPR STS